# Patient Record
Sex: FEMALE | Race: WHITE | Employment: FULL TIME | URBAN - METROPOLITAN AREA
[De-identification: names, ages, dates, MRNs, and addresses within clinical notes are randomized per-mention and may not be internally consistent; named-entity substitution may affect disease eponyms.]

---

## 2017-01-27 ENCOUNTER — HOSPITAL ENCOUNTER (OUTPATIENT)
Dept: MAMMOGRAPHY | Age: 52
Discharge: HOME | End: 2017-01-27
Attending: FAMILY MEDICINE
Payer: COMMERCIAL

## 2017-01-27 DIAGNOSIS — Z09 FOLLOW-UP EXAM, 3-6 MONTHS SINCE PREVIOUS EXAM: ICD-10-CM

## 2017-01-27 PROCEDURE — 77062 BREAST TOMOSYNTHESIS BI: CPT

## 2017-01-27 PROCEDURE — 77066 DX MAMMO INCL CAD BI: CPT

## 2017-01-27 NOTE — PROGRESS NOTES
Quick Note:    Please call pt with normal results of mammogram and repeat annually as directed--- Dr. Marc Wright  ______

## 2017-03-04 ENCOUNTER — LAB ENCOUNTER (OUTPATIENT)
Dept: LAB | Age: 52
End: 2017-03-04
Attending: OTOLARYNGOLOGY
Payer: COMMERCIAL

## 2017-03-04 DIAGNOSIS — R05.9 COUGH: ICD-10-CM

## 2017-03-04 DIAGNOSIS — J32.0 CHRONIC MAXILLARY SINUSITIS: ICD-10-CM

## 2017-03-04 PROCEDURE — 86003 ALLG SPEC IGE CRUDE XTRC EA: CPT

## 2017-03-04 PROCEDURE — 36415 COLL VENOUS BLD VENIPUNCTURE: CPT

## 2017-03-08 LAB
ALLERGEN,  SHRIMP IGE: <0.1 KU/L
ALLERGEN, ALMOND IGE: <0.1 KU/L
ALLERGEN, BRAZIL NUT IGE: <0.1 KU/L
ALLERGEN, CASHEW IGE: <0.1 KU/L
ALLERGEN, CHESTNUT IGE: <0.1 KU/L
ALLERGEN, CLAM IGE: <0.1 KU/L
ALLERGEN, CODFISH: <0.1 KU/L
ALLERGEN, CORN IGE: <0.1 KU/L
ALLERGEN, EGG WHITE IGE: <0.1 KU/L
ALLERGEN, HAZELNUT IGE: <0.1 KU/L
ALLERGEN, MILK (COW) IGE: <0.1 KU/L
ALLERGEN, PEANUT IGE: <0.1 KU/L
ALLERGEN, PEANUT IGE: <0.1 KU/L
ALLERGEN, PECAN IGE: <0.1 KU/L
ALLERGEN, RICE IGE: <0.1 KU/L
ALLERGEN, SCALLOP IGE: <0.1 KU/L
ALLERGEN, SOYBEAN IGE: <0.1 KU/L
ALLERGEN, TOMATO IGE: <0.1 KU/L
ALLERGEN, WALNUT/BLACK WALNUT: <0.1 KU/L
ALLERGEN, WALNUT/BLACK WALNUT: <0.1 KU/L
ALLERGEN, WHEAT IGE: <0.1 KU/L
IMMUNOGLOBULIN E: 3 KU/L
Lab: <0.1 KU/L

## 2017-03-09 ENCOUNTER — MED REC SCAN ONLY (OUTPATIENT)
Dept: FAMILY MEDICINE CLINIC | Facility: CLINIC | Age: 52
End: 2017-03-09

## 2017-03-13 NOTE — PROGRESS NOTES
Received fax from [address] [place] [address] from [address] stating pt had colonoscopy completed 3/9/2017. Due every 10 yrs. Paper sent to scan.

## 2017-03-15 NOTE — PROGRESS NOTES
Quick Note:    Burton Cadena. I reviewed your blood allergy testing and all results are in the normal range with none suggesting a food allergy. In addition you total IgE which a marker of allergy inflammation in the body was in the normal range.  If you continue

## 2017-05-22 ENCOUNTER — TELEPHONE (OUTPATIENT)
Dept: FAMILY MEDICINE CLINIC | Facility: CLINIC | Age: 52
End: 2017-05-22

## 2017-05-22 DIAGNOSIS — E03.9 HYPOTHYROIDISM, ACQUIRED: Primary | ICD-10-CM

## 2017-05-22 RX ORDER — LEVOTHYROXINE SODIUM 112 UG/1
112 TABLET ORAL
Qty: 90 TABLET | Refills: 0 | Status: SHIPPED | OUTPATIENT
Start: 2017-05-22 | End: 2017-07-15

## 2017-05-22 NOTE — TELEPHONE ENCOUNTER
Refill protocol passed because the patient met the following protocol for Levothyroxine.   No further refills, patient needs labs

## 2017-07-12 NOTE — ADDENDUM NOTE
Encounter addended by: Laura Moran on: 7/12/2017  7:35 AM<BR>    Actions taken: Letter status changed

## 2017-07-15 DIAGNOSIS — Z13.220 SCREENING FOR LIPID DISORDERS: ICD-10-CM

## 2017-07-15 DIAGNOSIS — E03.9 HYPOTHYROIDISM, ACQUIRED: Primary | ICD-10-CM

## 2017-07-17 RX ORDER — LEVOTHYROXINE SODIUM 112 UG/1
TABLET ORAL
Qty: 30 TABLET | Refills: 0 | Status: SHIPPED | OUTPATIENT
Start: 2017-07-17 | End: 2017-09-20

## 2017-07-17 NOTE — TELEPHONE ENCOUNTER
Please call patient and let her know that she is due for repeat thyroid labs that were due in May. Fasting preventative labs have been placed as well for her to complete. Cbc,cmp, lipid,tsh are placed into the [facility] system.   Medication requested for levot

## 2017-07-17 NOTE — TELEPHONE ENCOUNTER
Spoke to pt, she is aware that there are fasting labs and will have them done.  Pt is also aware that there was 30 days of Levothyroxine were called in

## 2017-08-19 ENCOUNTER — LAB ENCOUNTER (OUTPATIENT)
Dept: LAB | Age: 52
End: 2017-08-19
Attending: FAMILY MEDICINE
Payer: COMMERCIAL

## 2017-08-19 DIAGNOSIS — E03.9 HYPOTHYROIDISM, ACQUIRED: ICD-10-CM

## 2017-08-19 DIAGNOSIS — Z13.220 SCREENING FOR LIPID DISORDERS: ICD-10-CM

## 2017-08-19 LAB
ALBUMIN SERPL-MCNC: 3.9 G/DL (ref 3.5–4.8)
ALP LIVER SERPL-CCNC: 56 U/L (ref 41–108)
ALT SERPL-CCNC: 19 U/L (ref 14–54)
AST SERPL-CCNC: 18 U/L (ref 15–41)
BASOPHILS # BLD AUTO: 0.05 X10(3) UL (ref 0–0.1)
BASOPHILS NFR BLD AUTO: 0.6 %
BILIRUB SERPL-MCNC: 0.6 MG/DL (ref 0.1–2)
BUN BLD-MCNC: 12 MG/DL (ref 8–20)
CALCIUM BLD-MCNC: 8.9 MG/DL (ref 8.3–10.3)
CHLORIDE: 104 MMOL/L (ref 101–111)
CHOLEST SMN-MCNC: 226 MG/DL (ref ?–200)
CO2: 27 MMOL/L (ref 22–32)
CREAT BLD-MCNC: 0.94 MG/DL (ref 0.55–1.02)
EOSINOPHIL # BLD AUTO: 0.14 X10(3) UL (ref 0–0.3)
EOSINOPHIL NFR BLD AUTO: 1.8 %
ERYTHROCYTE [DISTWIDTH] IN BLOOD BY AUTOMATED COUNT: 13.8 % (ref 11.5–16)
FREE T4: 1.1 NG/DL (ref 0.9–1.8)
GLUCOSE BLD-MCNC: 87 MG/DL (ref 70–99)
HCT VFR BLD AUTO: 41.2 % (ref 34–50)
HDLC SERPL-MCNC: 55 MG/DL (ref 45–?)
HDLC SERPL: 4.11 {RATIO} (ref ?–4.44)
HGB BLD-MCNC: 12.9 G/DL (ref 12–16)
IMMATURE GRANULOCYTE COUNT: 0.03 X10(3) UL (ref 0–1)
IMMATURE GRANULOCYTE RATIO %: 0.4 %
LDLC SERPL CALC-MCNC: 144 MG/DL (ref ?–130)
LDLC SERPL-MCNC: 27 MG/DL (ref 5–40)
LYMPHOCYTES # BLD AUTO: 2.32 X10(3) UL (ref 0.9–4)
LYMPHOCYTES NFR BLD AUTO: 29.3 %
M PROTEIN MFR SERPL ELPH: 7.6 G/DL (ref 6.1–8.3)
MCH RBC QN AUTO: 28.1 PG (ref 27–33.2)
MCHC RBC AUTO-ENTMCNC: 31.3 G/DL (ref 31–37)
MCV RBC AUTO: 89.8 FL (ref 81–100)
MONOCYTES # BLD AUTO: 0.61 X10(3) UL (ref 0.1–0.6)
MONOCYTES NFR BLD AUTO: 7.7 %
NEUTROPHIL ABS PRELIM: 4.77 X10 (3) UL (ref 1.3–6.7)
NEUTROPHILS # BLD AUTO: 4.77 X10(3) UL (ref 1.3–6.7)
NEUTROPHILS NFR BLD AUTO: 60.2 %
NONHDLC SERPL-MCNC: 171 MG/DL (ref ?–130)
PLATELET # BLD AUTO: 259 10(3)UL (ref 150–450)
POTASSIUM SERPL-SCNC: 4.4 MMOL/L (ref 3.6–5.1)
RBC # BLD AUTO: 4.59 X10(6)UL (ref 3.8–5.1)
RED CELL DISTRIBUTION WIDTH-SD: 45 FL (ref 35.1–46.3)
SODIUM SERPL-SCNC: 137 MMOL/L (ref 136–144)
TRIGLYCERIDES: 135 MG/DL (ref ?–150)
TSI SER-ACNC: 2.01 MIU/ML (ref 0.35–5.5)
WBC # BLD AUTO: 7.9 X10(3) UL (ref 4–13)

## 2017-08-19 PROCEDURE — 36415 COLL VENOUS BLD VENIPUNCTURE: CPT | Performed by: FAMILY MEDICINE

## 2017-08-19 PROCEDURE — 80050 GENERAL HEALTH PANEL: CPT | Performed by: FAMILY MEDICINE

## 2017-08-19 PROCEDURE — 80061 LIPID PANEL: CPT | Performed by: FAMILY MEDICINE

## 2017-08-19 PROCEDURE — 84439 ASSAY OF FREE THYROXINE: CPT | Performed by: FAMILY MEDICINE

## 2017-09-02 NOTE — PROGRESS NOTES
Please call pt with stable CBC, thyroid, CMP, lipid results and repeat as directed annually and apologize to pt for late results as I was out of office at time these were drawn ---  Dr. Lord Spearing

## 2017-09-20 ENCOUNTER — OFFICE VISIT (OUTPATIENT)
Dept: FAMILY MEDICINE CLINIC | Facility: CLINIC | Age: 52
End: 2017-09-20

## 2017-09-20 ENCOUNTER — HOSPITAL ENCOUNTER (OUTPATIENT)
Dept: GENERAL RADIOLOGY | Age: 52
Discharge: HOME | End: 2017-09-20
Attending: FAMILY MEDICINE
Payer: COMMERCIAL

## 2017-09-20 VITALS
HEIGHT: 68 IN | SYSTOLIC BLOOD PRESSURE: 120 MMHG | BODY MASS INDEX: 32.28 KG/M2 | HEART RATE: 83 BPM | DIASTOLIC BLOOD PRESSURE: 68 MMHG | RESPIRATION RATE: 12 BRPM | OXYGEN SATURATION: 99 % | WEIGHT: 213 LBS | TEMPERATURE: 98 F

## 2017-09-20 DIAGNOSIS — Z13.31 NEGATIVE DEPRESSION SCREENING: ICD-10-CM

## 2017-09-20 DIAGNOSIS — Z23 FLU VACCINE NEED: ICD-10-CM

## 2017-09-20 DIAGNOSIS — M25.551 PAIN OF RIGHT HIP JOINT: ICD-10-CM

## 2017-09-20 DIAGNOSIS — E03.9 HYPOTHYROIDISM, ACQUIRED: ICD-10-CM

## 2017-09-20 DIAGNOSIS — Z00.00 ANNUAL PHYSICAL EXAM: Primary | ICD-10-CM

## 2017-09-20 DIAGNOSIS — Z23 NEED FOR VACCINATION: ICD-10-CM

## 2017-09-20 PROCEDURE — 90686 IIV4 VACC NO PRSV 0.5 ML IM: CPT | Performed by: FAMILY MEDICINE

## 2017-09-20 PROCEDURE — 73502 X-RAY EXAM HIP UNI 2-3 VIEWS: CPT | Performed by: FAMILY MEDICINE

## 2017-09-20 PROCEDURE — 99396 PREV VISIT EST AGE 40-64: CPT | Performed by: FAMILY MEDICINE

## 2017-09-20 PROCEDURE — 90471 IMMUNIZATION ADMIN: CPT | Performed by: FAMILY MEDICINE

## 2017-09-20 RX ORDER — LEVOCETIRIZINE DIHYDROCHLORIDE 5 MG/1
5 TABLET, FILM COATED ORAL EVERY EVENING
COMMUNITY
End: 2018-01-15 | Stop reason: ALTCHOICE

## 2017-09-20 RX ORDER — LEVOTHYROXINE SODIUM 112 UG/1
112 TABLET ORAL
Qty: 90 TABLET | Refills: 3 | Status: SHIPPED | OUTPATIENT
Start: 2017-09-20 | End: 2018-09-14

## 2017-09-20 NOTE — PATIENT INSTRUCTIONS
Schedule XR Hip  Tylenol for pain  Continue to focus on weight loss    Thank you for allowing me to participate in your care today. I will contact you with any results from today's visit.   Lab results are typically available in 2-3 days for blood tests,

## 2017-09-20 NOTE — PROGRESS NOTES
HPI:   Angela Aly is a 46year old female that presents for annual exam.     She has a hx of chronic cough, currently being seen by ENT cough specialist and on gabapentin to \"help retrain her cough reflex\". She has noticed a dramatic improvement. rhythm, no murmurs, rubs or gallops. LUNGS: Clear to auscultation bilterally, no rales/rhonchi/wheezing. ABDOMEN:  Soft, nondistended, nontender, bowel sounds normal in all 4 quadrants, no masses, no hepatosplenomegaly.   EXTREMITIES:  No edema, no cyanos

## 2017-10-02 ENCOUNTER — OFFICE VISIT (OUTPATIENT)
Dept: PHYSICAL THERAPY | Age: 52
End: 2017-10-02
Attending: FAMILY MEDICINE
Payer: COMMERCIAL

## 2017-10-02 DIAGNOSIS — M25.551 PAIN OF RIGHT HIP JOINT: ICD-10-CM

## 2017-10-02 PROCEDURE — 97162 PT EVAL MOD COMPLEX 30 MIN: CPT

## 2017-10-02 PROCEDURE — 97110 THERAPEUTIC EXERCISES: CPT

## 2017-10-02 NOTE — PROGRESS NOTES
LOWER EXTREMITY EVALUATION:   Referring Physician: Dr. Jerod Ram  Diagnosis: R Hip pain     Date of Service: 10/2/2017     PATIENT SUMMARY   Tex Hashimoto is a 46year old y/o female who presents to therapy today with complaints of right hip pain.  Progressiv passive elongation. Fredo Leslie would benefit from skilled Physical Therapy to address the above impairments to improve hip ROM, improve strength, improve lumbar mobility, muscle length in order to improve functional mobility and quality of life.      Precaution R 5/5; L 5/5  Extension: R 5/5; L 5/5    DF: R 5/5; L 5/5  PF: R 5/5; L 5/5  INV: R 5/5; L 5/5  EV: R 5/5; L 5/5  Great toe ext: R 5-/5; L 5/5     Special tests:   Hip/Lumbar Distraction: R +, L -  SI compression/distraction: -  Anterior thigh thrust: R + Re-education;  Therapeutic Activity; Gait Training; Electrical Stim; Pt education; Home exercise program instructions;     Education or treatment limitation: None  Rehab Potential:excellent    FOTO: 49/100    Patient/Family/Caregiver was advised of these fi

## 2017-10-09 ENCOUNTER — OFFICE VISIT (OUTPATIENT)
Dept: PHYSICAL THERAPY | Age: 52
End: 2017-10-09
Attending: FAMILY MEDICINE
Payer: COMMERCIAL

## 2017-10-09 PROCEDURE — 97110 THERAPEUTIC EXERCISES: CPT

## 2017-10-09 PROCEDURE — 97140 MANUAL THERAPY 1/> REGIONS: CPT

## 2017-10-09 NOTE — PROGRESS NOTES
Dx: R hip pain - SIJ and lumbar         Authorized # of Visits:  8         Next MD visit: none scheduled  Fall Risk: standard         Precautions: n/a             Subjective: States the left hip is increasing in pain, right hip has remained the same with m TX#: 4/ Date:               TX#: 5/ Date:               TX#: 6/ Date:               TX#: 7/ Date:               TX#: 8/   Nustep x 8 min         Hip/lumbar screen - 5 min          Manual:   Long axis distraction - hold 30 sec x 8 reps  Off 15 sec

## 2017-10-12 ENCOUNTER — OFFICE VISIT (OUTPATIENT)
Dept: PHYSICAL THERAPY | Age: 52
End: 2017-10-12
Attending: FAMILY MEDICINE
Payer: COMMERCIAL

## 2017-10-12 PROCEDURE — 97140 MANUAL THERAPY 1/> REGIONS: CPT

## 2017-10-12 PROCEDURE — 97110 THERAPEUTIC EXERCISES: CPT

## 2017-10-12 NOTE — PROGRESS NOTES
Dx: R hip pain - SIJ and lumbar         Authorized # of Visits:  8         Next MD visit: none scheduled  Fall Risk: standard         Precautions: n/a             Subjective: Right hip 3/10 Left 2/10 states she has felt more aching overall yesterday.  Relat including stair negotiation. (8 visits)  -Pt to be independent and compliant with HEP.        Plan: open chain exercises hip strengthening, bridging, and add resistance   Date: 10/9/2017 TX#: 2/8 Date:     10/12/2017           TX#: 3/   Date:

## 2017-10-16 ENCOUNTER — OFFICE VISIT (OUTPATIENT)
Dept: PHYSICAL THERAPY | Age: 52
End: 2017-10-16
Attending: FAMILY MEDICINE
Payer: COMMERCIAL

## 2017-10-16 PROCEDURE — 97140 MANUAL THERAPY 1/> REGIONS: CPT

## 2017-10-16 PROCEDURE — 97110 THERAPEUTIC EXERCISES: CPT

## 2017-10-16 NOTE — PROGRESS NOTES
Dx: R hip pain - SIJ and lumbar         Authorized # of Visits:  8         Next MD visit: none scheduled  Fall Risk: standard         Precautions: n/a             Subjective: right hip 3/10, left hip 2/10.  She states she feels better after movement, exerci independent and compliant with HEP.        Plan: open chain exercises hip strengthening, bridging, and add resistance   Date: 10/9/2017 TX#: 2/8 Date:     10/12/2017           TX#: 3/   Date:    10/16/2017            TX#: 4/ Date:               TX#: 5/ Date education, demonstration, verbal and tactile cues to optimize performance and motor control    Charges: MT x 1 TE x 2       (HP x 10 min ) Total Timed Treatment: 45 min  Total Treatment Time: 55 min

## 2017-10-18 ENCOUNTER — OFFICE VISIT (OUTPATIENT)
Dept: PHYSICAL THERAPY | Age: 52
End: 2017-10-18
Attending: FAMILY MEDICINE
Payer: COMMERCIAL

## 2017-10-18 PROCEDURE — 97110 THERAPEUTIC EXERCISES: CPT

## 2017-10-18 PROCEDURE — 97140 MANUAL THERAPY 1/> REGIONS: CPT

## 2017-10-18 NOTE — PROGRESS NOTES
Dx: R hip pain - SIJ and lumbar          Authorized # of Visits:  8         Next MD visit: none scheduled  Fall Risk: standard         Precautions: n/a             Subjective: right hip 3/10, left hip 2/10.  States yesterday she wore different shoes and fel for stair negotiation. (8 visits)  -Pt to demonstrate pain free SLS on R for 20 seconds, to improve stability for single leg tasks including stair negotiation. (8 visits)  -Pt to be independent and compliant with HEP.        Plan: open chain exercises hip s 10x2  Bent knee swiss ball bridge  Supine bent knee fall out 10x2 RTB  Supine bridge 10x2   Supine bridge with RTB for abduction 10x2   Swiss ball bridge 10x2  Bent knee swiss ball bridge       Leg swings with ankle weight (1#)  Hip abduction standing 10x2

## 2017-10-23 ENCOUNTER — OFFICE VISIT (OUTPATIENT)
Dept: PHYSICAL THERAPY | Age: 52
End: 2017-10-23
Attending: FAMILY MEDICINE
Payer: COMMERCIAL

## 2017-10-23 PROCEDURE — 97140 MANUAL THERAPY 1/> REGIONS: CPT

## 2017-10-23 PROCEDURE — 97110 THERAPEUTIC EXERCISES: CPT

## 2017-10-23 NOTE — PROGRESS NOTES
Dx: R hip pain - SIJ and lumbar          Authorized # of Visits:  8         Next MD visit: none scheduled  Fall Risk: standard         Precautions: n/a             Subjective: right hip 2.5/10, left hip 0/10 today.  States she is feeling a little better, wa bridging, and add resistance   Date: 10/9/2017 TX#: 2/8 Date:     10/12/2017           TX#: 3/   Date:    10/16/2017            TX#: 4/ Date: 10/18/2017               TX#: 5/ Date:      10/23/2017          TX#: 6/ Date:               TX#: 7/ Date: ball bridge  Supine bent knee fall out 10x2 RTB  Supine bridge 10x2   Supine bridge with RTB for abduction 10x2   Swiss ball bridge 10x2  Bent knee swiss ball bridge  Bridge 10x2   SL bridge  10x2  Bridge on  Swiss ball 10x2       Leg swings with ankle maria elena

## 2017-11-01 ENCOUNTER — OFFICE VISIT (OUTPATIENT)
Dept: PHYSICAL THERAPY | Age: 52
End: 2017-11-01
Attending: FAMILY MEDICINE
Payer: COMMERCIAL

## 2017-11-01 PROCEDURE — 97110 THERAPEUTIC EXERCISES: CPT

## 2017-11-01 PROCEDURE — 97140 MANUAL THERAPY 1/> REGIONS: CPT

## 2017-11-01 NOTE — PROGRESS NOTES
Dx: R hip pain - SIJ and lumbar          Authorized # of Visits:  8         Next MD visit: none scheduled  Fall Risk: standard         Precautions: n/a             Subjective: right hip 2.0/10; left hip 0/10. While on trip pain more increased in evening.  P Date:      10/23/2017          TX#: 6/ Date:       11/1/2017         TX#: 7/ Date:               TX#: 8/   Nustep x 8 min Nustep x 8 min Nustep x 8 min Nustep x 8 min TM 2.0  Mph 5 m in  TM 2.0  Mph 5 m in     Hip/lumbar screen - 5 min  Hip/lumbar screen - 10x2  Bent knee swiss ball bridge  Supine bent knee fall out 10x2 RTB  Supine bridge 10x2   Supine bridge with RTB for abduction 10x2   Swiss ball bridge 10x2  Bent knee swiss ball bridge  Bridge 10x2   SL bridge  10x2  Bridge on  Swiss ball 10x2   SL brid

## 2017-11-16 ENCOUNTER — OFFICE VISIT (OUTPATIENT)
Dept: PHYSICAL THERAPY | Age: 52
End: 2017-11-16
Attending: FAMILY MEDICINE
Payer: COMMERCIAL

## 2017-11-16 PROCEDURE — 97110 THERAPEUTIC EXERCISES: CPT

## 2017-11-16 PROCEDURE — 97140 MANUAL THERAPY 1/> REGIONS: CPT

## 2017-11-16 NOTE — PROGRESS NOTES
Dx: R hip pain - SIJ and lumbar          Authorized # of Visits:  8         Next MD visit: none scheduled  Fall Risk: standard         Precautions: n/a             Subjective: right hip 30/10; left hip 0/10.   Having difficulty sleeping on both sides     Ob Date:    10/16/2017            TX#: 4/ Date: 10/18/2017               TX#: 5/ Date:      10/23/2017          TX#: 6/ Date:       11/1/2017         TX#: 7/ Date: 11/16/2017                TX#: 8/   Nustep x 8 min Nustep x 8 min Nustep x 8 min Nustep x 8 min 10x3  Supine bent knee fall out 10x2 YTB Supine bent knee fall out 10x2 YTB  Supine bridge 10x2   Supine bridge with YTB for abduction 10x2  Supine bent knee fall out 10x2 YTB  Supine bridge 10x2   Supine bridge with RTB for abduction 10x2   Swiss ball [place]

## 2017-11-20 ENCOUNTER — OFFICE VISIT (OUTPATIENT)
Dept: PHYSICAL THERAPY | Age: 52
End: 2017-11-20
Attending: FAMILY MEDICINE
Payer: COMMERCIAL

## 2017-11-20 PROCEDURE — 97140 MANUAL THERAPY 1/> REGIONS: CPT

## 2017-11-20 PROCEDURE — 97110 THERAPEUTIC EXERCISES: CPT

## 2017-11-20 NOTE — PROGRESS NOTES
Dx: R hip pain - SIJ and lumbar          Authorized # of Visits:  8   Extended 11/20/2017       Next MD visit: none scheduled  Fall Risk: standard         Precautions: n/a             Subjective: right hip 2/10; left hip 0/10.   Having benefit from foam rol improve stability for single leg tasks including stair negotiation. (8 visits)  -Pt to be independent and compliant with HEP.        Plan: open chain exercises hip strengthening, bridging, and add resistance   Date: 10/9/2017 TX#: 2/8 Date:     10/12/2017 comparable sign)  STM to ITband  (both  Sides)    Foam roll to glute med and min and figure 4 stretch     25 min    Piriformis stretch - 30 sec x 3 sets each side  IT  Band stretch  30 sec x 2  Piriformis stretch   In figure four position 30 sec x 3 sets e severity of symptoms; specific exercises selected based on patients  Impairments; specific education, demonstration, verbal and tactile cues to optimize performance and motor control    Charges: MT x 1 TE x 2   Total Timed Treatment: 45 min  Total Treatmen

## 2017-11-27 ENCOUNTER — OFFICE VISIT (OUTPATIENT)
Dept: PHYSICAL THERAPY | Age: 52
End: 2017-11-27
Attending: FAMILY MEDICINE
Payer: COMMERCIAL

## 2017-11-27 PROCEDURE — 97110 THERAPEUTIC EXERCISES: CPT

## 2017-11-27 PROCEDURE — 97140 MANUAL THERAPY 1/> REGIONS: CPT

## 2017-11-27 NOTE — PROGRESS NOTES
Dx: R hip pain - SIJ and lumbar          Authorized # of Visits:  8   Extended 11/20/2017       Next MD visit: none scheduled  Fall Risk: standard         Precautions: n/a             Subjective: right hip 1/10; left hip 0/10.  She states she is feeling a l Date: 10/9/2017 TX#: 2/8 Date:     10/12/2017           TX#: 3/   Date:    10/16/2017            TX#: 4/ Date: 10/18/2017               TX#: 5/ Date:      10/23/2017          TX#: 6/ Date:       11/1/2017         TX#: 7/ Date: 11/16/2017                T extension comparable sign)  STM to ITband  (both  Sides)    Foam roll to glute med and min and figure 4 stretch     25 min   Piriformis stretch - 30 sec x 3 sets each side  IT  Band stretch  30 sec x 2  Piriformis stretch   In figure four position 30 sec x Reformer 10x each leg 2 sets (4 cords)  Double leg 7 cords 12x2    Heat pack 10 min  Heat pack 10 min Heat pack 10 min Heat pack 10 min        Skilled Services: manual intervention to address impairments with consideration of irritability and severity of s

## 2017-12-05 ENCOUNTER — APPOINTMENT (OUTPATIENT)
Dept: PHYSICAL THERAPY | Age: 52
End: 2017-12-05
Attending: FAMILY MEDICINE
Payer: COMMERCIAL

## 2017-12-06 ENCOUNTER — OFFICE VISIT (OUTPATIENT)
Dept: PHYSICAL THERAPY | Age: 52
End: 2017-12-06
Attending: FAMILY MEDICINE
Payer: COMMERCIAL

## 2017-12-06 DIAGNOSIS — E03.9 HYPOTHYROIDISM, ACQUIRED: ICD-10-CM

## 2017-12-06 PROCEDURE — 97140 MANUAL THERAPY 1/> REGIONS: CPT

## 2017-12-06 PROCEDURE — 97110 THERAPEUTIC EXERCISES: CPT

## 2017-12-06 NOTE — TELEPHONE ENCOUNTER
From: Leona Carrel  Sent: 12/6/2017 1:55 PM CST  Subject: Medication Renewal Request    Leona Carrel would like a refill of the following medications:     Levothyroxine Sodium 112 MCG Oral Tab Garcia Funez, DO]    [facility]erred [facility]

## 2017-12-06 NOTE — PROGRESS NOTES
Dx: R hip pain - SIJ and lumbar          Authorized # of Visits:  8   Extended 11/20/2017       Next MD visit: none scheduled  Fall Risk: standard         Precautions: n/a             Subjective: right hip 1/10; left hip 0/10.  States that she was traveling 4/ Date: 10/18/2017               TX#: 5/ Date:      10/23/2017          TX#: 6/ Date:       11/1/2017         TX#: 7/ Date: 11/16/2017                TX#: 8/ 11/20/2017    TX  9 11/27/2017   TX 10  12/6/2017   TX 11     Nustep x 8 min Nustep x 8 min TM 2. YTB  Supine bridge 10x2   Supine bridge with RTB for abduction 10x2   Swiss ball bridge 10x2  Bent knee swiss ball bridge  Supine bent knee fall out 10x2 RTB  Supine bridge 10x2   Supine bridge with RTB for abduction 10x2   Swiss ball bridge 10x2  Bent kne

## 2017-12-07 RX ORDER — LEVOTHYROXINE SODIUM 112 UG/1
112 TABLET ORAL
Qty: 90 TABLET | Refills: 3
Start: 2017-12-07 | End: 2018-12-02

## 2017-12-07 NOTE — TELEPHONE ENCOUNTER
Requesting: Levothyroxine 112mcg  LOV: 9/20/17  RTC: 1 year  Last Relevant Labs: 8/19/17  Filled: 9/20/17 #90 with 3 refills    Future Appointments  Date Time Provider Dali Moore   12/13/2017 2:50 PM John Prajapati MD [id number] [address]   12/20

## 2017-12-20 ENCOUNTER — OFFICE VISIT (OUTPATIENT)
Dept: PHYSICAL THERAPY | Age: 52
End: 2017-12-20
Attending: FAMILY MEDICINE
Payer: COMMERCIAL

## 2017-12-20 PROCEDURE — 97110 THERAPEUTIC EXERCISES: CPT

## 2017-12-20 NOTE — PROGRESS NOTES
Discharge Summary    Pt has attended 12, cancelled 2, and no shown 0 visits in Physical Therapy. Subjective: Tacho Chen states she feels like she has made 80% progress.  She does think she is ready to be discharged from therapy due to her pain has been  Mini eval  Was positive)  FAIDERS: r -  l - (at eval  Was positive)    Goals:   -Pt to demonstrate ability to walk for 5 minutes before onset of pain for patient to walk into work office without discomfort. (4 visits) 11/20/2017   -Pt to demonstrate ability to axis distraction - hold 30 sec x 8 reps  Off 15 sec     (standing hip extension comparable sign)  STM to ITband  (both  Sides)    15  Min  Manual:   (standing hip extension comparable sign)  STM to ITband  (both  Sides)    Foam roll to glute med and min cords)  Double leg 8 cords 15x2            Skilled Services: manual intervention to address impairments with consideration of irritability and severity of symptoms; specific exercises selected based on patients  Impairments; specific education, demonstrati

## 2018-01-15 ENCOUNTER — OFFICE VISIT (OUTPATIENT)
Dept: FAMILY MEDICINE CLINIC | Facility: CLINIC | Age: 53
End: 2018-01-15

## 2018-01-15 VITALS
OXYGEN SATURATION: 99 % | RESPIRATION RATE: 18 BRPM | HEART RATE: 78 BPM | TEMPERATURE: 99 F | WEIGHT: 213 LBS | SYSTOLIC BLOOD PRESSURE: 122 MMHG | BODY MASS INDEX: 32 KG/M2 | DIASTOLIC BLOOD PRESSURE: 60 MMHG

## 2018-01-15 DIAGNOSIS — R35.0 URINE FREQUENCY: Primary | ICD-10-CM

## 2018-01-15 LAB
APPEARANCE: CLEAR
MULTISTIX LOT#: ABNORMAL NUMERIC
PH, URINE: 6.5 (ref 4.5–8)
SPECIFIC GRAVITY: 1.02 (ref 1–1.03)
URINE-COLOR: YELLOW
UROBILINOGEN,SEMI-QN: 0.2 MG/DL (ref 0–1.9)

## 2018-01-15 PROCEDURE — 87077 CULTURE AEROBIC IDENTIFY: CPT | Performed by: NURSE PRACTITIONER

## 2018-01-15 PROCEDURE — 99213 OFFICE O/P EST LOW 20 MIN: CPT | Performed by: NURSE PRACTITIONER

## 2018-01-15 PROCEDURE — 87186 SC STD MICRODIL/AGAR DIL: CPT | Performed by: NURSE PRACTITIONER

## 2018-01-15 PROCEDURE — 81003 URINALYSIS AUTO W/O SCOPE: CPT | Performed by: NURSE PRACTITIONER

## 2018-01-15 PROCEDURE — 87086 URINE CULTURE/COLONY COUNT: CPT | Performed by: NURSE PRACTITIONER

## 2018-01-15 RX ORDER — PHENAZOPYRIDINE HYDROCHLORIDE 200 MG/1
200 TABLET, FILM COATED ORAL 3 TIMES DAILY PRN
Qty: 6 TABLET | Refills: 0 | Status: SHIPPED | OUTPATIENT
Start: 2018-01-15 | End: 2018-01-17

## 2018-01-15 RX ORDER — NITROFURANTOIN 25; 75 MG/1; MG/1
100 CAPSULE ORAL 2 TIMES DAILY
Qty: 14 CAPSULE | Refills: 0 | Status: SHIPPED | OUTPATIENT
Start: 2018-01-15 | End: 2018-01-22

## 2018-01-15 NOTE — PROGRESS NOTES
CHIEF COMPLAINT:   Patient presents with:  UTI: pt c\o of poss uti, x3days       HPI:   Waleska Costa is a 46year old female who presents with symptoms of UTI. Complaining of urinary frequency, urgency, and dysuria for last 3 days.  Symptoms have been st EYES:denies blurred vision or double vision  HEENT: no congestion, rhinorrhea, sore throat or ear pain  CARDIOVASCULAR: denies chest pain or palpitations  LUNGS: denies shortness of breath, cough, or wheezing  GI: See HPI. No N/V/C/D. : See HPI.   NEURO Phenazopyridine HCl 200 MG Oral Tab 6 tablet 0      Sig: Take 1 tablet (200 mg total) by mouth 3 (three) times daily as needed for Pain. Risk and benefits of medication discussed. Stressed importance of completing full course of antibiotic. The most common cause of bladder infections is bacteria from the bowels. The bacteria get onto the skin around the opening of the urethra. From there, they can get into the urine and travel up to the bladder, causing inflammation and infection.  This usuall · Urinate more often. Don't try to hold urine in for a long time. · Wear loose-fitting clothes and cotton underwear. Avoid tight-fitting pants. · Improve your diet and prevent constipation.  Eat more fresh fruit and vegetables, and fiber, and less junk an The patient is asked to return in 3 days if not better. Call if fever, vomiting, worsening symptoms.

## 2018-01-23 ENCOUNTER — PATIENT MESSAGE (OUTPATIENT)
Dept: FAMILY MEDICINE CLINIC | Facility: CLINIC | Age: 53
End: 2018-01-23

## 2018-01-23 DIAGNOSIS — Z12.31 ENCOUNTER FOR SCREENING MAMMOGRAM FOR MALIGNANT NEOPLASM OF BREAST: Primary | ICD-10-CM

## 2018-01-23 NOTE — TELEPHONE ENCOUNTER
From: Pierre Garcia  To: Marian Winkler DO  Sent: 1/23/2018 3:37 PM CST  Subject: Non-Urgent Medical Question    Please put a mammogram order in for me. I am due in January. Thanks.

## 2018-01-23 NOTE — TELEPHONE ENCOUNTER
Requesting Mammogram  LOV: 9/20/17  RTC: one year  Last Relevant Labs: 1/27/17    Notes Recorded by Angela Shaw DO on 1/27/2017 at 8:53 AM  Please call pt with normal results of mammogram and repeat annually as directed---  Dr. Keila Duke

## 2018-02-17 ENCOUNTER — HOSPITAL ENCOUNTER (OUTPATIENT)
Dept: MAMMOGRAPHY | Age: 53
Discharge: HOME | End: 2018-02-17
Attending: FAMILY MEDICINE
Payer: COMMERCIAL

## 2018-02-17 DIAGNOSIS — Z12.31 ENCOUNTER FOR SCREENING MAMMOGRAM FOR MALIGNANT NEOPLASM OF BREAST: ICD-10-CM

## 2018-02-17 PROCEDURE — 77067 SCR MAMMO BI INCL CAD: CPT | Performed by: FAMILY MEDICINE

## 2018-02-26 ENCOUNTER — HOSPITAL ENCOUNTER (OUTPATIENT)
Dept: MAMMOGRAPHY | Age: 53
Discharge: HOME | End: 2018-02-26
Attending: FAMILY MEDICINE
Payer: COMMERCIAL

## 2018-02-26 DIAGNOSIS — R92.2 INCONCLUSIVE MAMMOGRAM: ICD-10-CM

## 2018-02-26 DIAGNOSIS — Z12.31 ENCOUNTER FOR SCREENING MAMMOGRAM FOR MALIGNANT NEOPLASM OF BREAST: Primary | ICD-10-CM

## 2018-02-26 PROCEDURE — 77061 BREAST TOMOSYNTHESIS UNI: CPT | Performed by: FAMILY MEDICINE

## 2018-02-26 PROCEDURE — 77065 DX MAMMO INCL CAD UNI: CPT | Performed by: FAMILY MEDICINE

## 2018-08-26 ENCOUNTER — HOSPITAL ENCOUNTER (OUTPATIENT)
Dept: ULTRASOUND IMAGING | Age: 53
Discharge: HOME | End: 2018-08-26
Attending: OTOLARYNGOLOGY
Payer: COMMERCIAL

## 2018-08-26 DIAGNOSIS — E04.1 THYROID NODULE: ICD-10-CM

## 2018-08-26 PROCEDURE — 76536 US EXAM OF HEAD AND NECK: CPT | Performed by: OTOLARYNGOLOGY

## 2018-08-27 ENCOUNTER — PATIENT MESSAGE (OUTPATIENT)
Dept: FAMILY MEDICINE CLINIC | Facility: CLINIC | Age: 53
End: 2018-08-27

## 2018-08-27 DIAGNOSIS — E07.9 THYROID DISORDER: Primary | ICD-10-CM

## 2018-08-27 DIAGNOSIS — Z00.00 LABORATORY EXAMINATION ORDERED AS PART OF A ROUTINE GENERAL MEDICAL EXAMINATION: ICD-10-CM

## 2018-08-27 DIAGNOSIS — E78.2 MIXED HYPERLIPIDEMIA: ICD-10-CM

## 2018-08-27 NOTE — PROGRESS NOTES
Ang Thomason. Your thyroid ultrasound shows 2 nodules. The left nodule is smaller than previously, but there is a new right nodule. I would like to see you back in the office to discuss this nodule and possible biopsy. Let me know if you have any questions.

## 2018-08-28 PROBLEM — E78.2 MIXED HYPERLIPIDEMIA: Status: ACTIVE | Noted: 2017-01-01

## 2018-08-28 NOTE — TELEPHONE ENCOUNTER
From: Pita Avalos  To: Brown Dinero MD  Sent: 8/27/2018 8:43 PM CDT  Subject: Other    I have a physical on 9/20. Are there labs I should have done before then? Please put in order if so.  Thx

## 2018-08-28 NOTE — PROGRESS NOTES
Patient informed via [facility]
Requesting labs prior to appt   LOV: 9/20/17  RTC: none   Last Relevant Labs: 8/2017      Future Appointments  Date Time Provider Dali Teresa   9/4/2018 7:50 AM Doc Jarquin MD  [facility] [place]   9/21/2018 7:00 AM Zina Russell MD [facility]
sensation is normal and strength is normal.

## 2018-09-03 PROBLEM — E04.2 MULTIPLE THYROID NODULES: Status: ACTIVE | Noted: 2018-09-03

## 2018-09-04 PROBLEM — E03.8 HYPOTHYROIDISM DUE TO HASHIMOTO'S THYROIDITIS: Status: ACTIVE | Noted: 2018-09-04

## 2018-09-04 PROBLEM — E06.3 HYPOTHYROIDISM DUE TO HASHIMOTO'S THYROIDITIS: Status: ACTIVE | Noted: 2018-09-04

## 2018-09-10 ENCOUNTER — HOSPITAL ENCOUNTER (OUTPATIENT)
Dept: ULTRASOUND IMAGING | Facility: HOSPITAL | Age: 53
Discharge: HOME | End: 2018-09-10
Attending: OTOLARYNGOLOGY
Payer: COMMERCIAL

## 2018-09-10 DIAGNOSIS — E04.2 MULTIPLE THYROID NODULES: ICD-10-CM

## 2018-09-10 PROCEDURE — 10022 US FNA THYROID (CPT=10022/76942): CPT | Performed by: OTOLARYNGOLOGY

## 2018-09-10 PROCEDURE — 88173 CYTOPATH EVAL FNA REPORT: CPT | Performed by: OTOLARYNGOLOGY

## 2018-09-10 PROCEDURE — 76942 ECHO GUIDE FOR BIOPSY: CPT | Performed by: OTOLARYNGOLOGY

## 2018-09-13 ENCOUNTER — LAB ENCOUNTER (OUTPATIENT)
Dept: LAB | Age: 53
End: 2018-09-13
Attending: FAMILY MEDICINE
Payer: COMMERCIAL

## 2018-09-13 DIAGNOSIS — E07.9 THYROID DISORDER: ICD-10-CM

## 2018-09-13 DIAGNOSIS — Z00.00 LABORATORY EXAMINATION ORDERED AS PART OF A ROUTINE GENERAL MEDICAL EXAMINATION: ICD-10-CM

## 2018-09-13 DIAGNOSIS — E78.2 MIXED HYPERLIPIDEMIA: ICD-10-CM

## 2018-09-13 LAB
ALBUMIN SERPL-MCNC: 3.8 G/DL (ref 3.5–4.8)
ALBUMIN/GLOB SERPL: 1 {RATIO} (ref 1–2)
ALP LIVER SERPL-CCNC: 60 U/L (ref 41–108)
ALT SERPL-CCNC: 19 U/L (ref 14–54)
ANION GAP SERPL CALC-SCNC: 6 MMOL/L (ref 0–18)
AST SERPL-CCNC: 14 U/L (ref 15–41)
BASOPHILS # BLD AUTO: 0.05 X10(3) UL (ref 0–0.1)
BASOPHILS NFR BLD AUTO: 0.8 %
BILIRUB SERPL-MCNC: 0.6 MG/DL (ref 0.1–2)
BUN BLD-MCNC: 12 MG/DL (ref 8–20)
BUN/CREAT SERPL: 12.5 (ref 10–20)
CALCIUM BLD-MCNC: 8.6 MG/DL (ref 8.3–10.3)
CHLORIDE SERPL-SCNC: 104 MMOL/L (ref 101–111)
CHOLEST SMN-MCNC: 195 MG/DL (ref ?–200)
CO2 SERPL-SCNC: 27 MMOL/L (ref 22–32)
CREAT BLD-MCNC: 0.96 MG/DL (ref 0.55–1.02)
EOSINOPHIL # BLD AUTO: 0.15 X10(3) UL (ref 0–0.3)
EOSINOPHIL NFR BLD AUTO: 2.4 %
ERYTHROCYTE [DISTWIDTH] IN BLOOD BY AUTOMATED COUNT: 13.1 % (ref 11.5–16)
GLOBULIN PLAS-MCNC: 4 G/DL (ref 2.5–4)
GLUCOSE BLD-MCNC: 86 MG/DL (ref 70–99)
HCT VFR BLD AUTO: 39.1 % (ref 34–50)
HDLC SERPL-MCNC: 49 MG/DL (ref 40–59)
HGB BLD-MCNC: 12.4 G/DL (ref 12–16)
IMMATURE GRANULOCYTE COUNT: 0.01 X10(3) UL (ref 0–1)
IMMATURE GRANULOCYTE RATIO %: 0.2 %
LDLC SERPL CALC-MCNC: 126 MG/DL (ref ?–100)
LYMPHOCYTES # BLD AUTO: 1.63 X10(3) UL (ref 0.9–4)
LYMPHOCYTES NFR BLD AUTO: 25.9 %
M PROTEIN MFR SERPL ELPH: 7.8 G/DL (ref 6.1–8.3)
MCH RBC QN AUTO: 28.5 PG (ref 27–33.2)
MCHC RBC AUTO-ENTMCNC: 31.7 G/DL (ref 31–37)
MCV RBC AUTO: 89.9 FL (ref 81–100)
MONOCYTES # BLD AUTO: 0.52 X10(3) UL (ref 0.1–1)
MONOCYTES NFR BLD AUTO: 8.3 %
NEUTROPHIL ABS PRELIM: 3.94 X10 (3) UL (ref 1.3–6.7)
NEUTROPHILS # BLD AUTO: 3.94 X10(3) UL (ref 1.3–6.7)
NEUTROPHILS NFR BLD AUTO: 62.4 %
NONHDLC SERPL-MCNC: 146 MG/DL (ref ?–130)
OSMOLALITY SERPL CALC.SUM OF ELEC: 283 MOSM/KG (ref 275–295)
PLATELET # BLD AUTO: 231 10(3)UL (ref 150–450)
POTASSIUM SERPL-SCNC: 4.4 MMOL/L (ref 3.6–5.1)
RBC # BLD AUTO: 4.35 X10(6)UL (ref 3.8–5.1)
RED CELL DISTRIBUTION WIDTH-SD: 43.1 FL (ref 35.1–46.3)
SODIUM SERPL-SCNC: 137 MMOL/L (ref 136–144)
T4 FREE SERPL-MCNC: 1.2 NG/DL (ref 0.9–1.8)
TRIGL SERPL-MCNC: 102 MG/DL (ref 30–149)
TSI SER-ACNC: 1.42 MIU/ML (ref 0.35–5.5)
VLDLC SERPL CALC-MCNC: 20 MG/DL (ref 0–30)
WBC # BLD AUTO: 6.3 X10(3) UL (ref 4–13)

## 2018-09-13 PROCEDURE — 84439 ASSAY OF FREE THYROXINE: CPT | Performed by: FAMILY MEDICINE

## 2018-09-13 PROCEDURE — 36415 COLL VENOUS BLD VENIPUNCTURE: CPT | Performed by: FAMILY MEDICINE

## 2018-09-13 PROCEDURE — 80061 LIPID PANEL: CPT | Performed by: FAMILY MEDICINE

## 2018-09-13 PROCEDURE — 80050 GENERAL HEALTH PANEL: CPT | Performed by: FAMILY MEDICINE

## 2018-09-13 NOTE — PROGRESS NOTES
Staff-- please place order for thyroid US due 3/2019    Edwardo Langston. Your needle biopsy of the right thyroid nodule is benign. I recommend repeat thyroid ultrasound in 6 months to make sure th nodule is not increasing in size.  I will have my staff place the or

## 2018-09-13 NOTE — PROGRESS NOTES
Attempted to contact pt. Per phone consent [phone] Cell. Left detailed message with test results. An order for US thyroid due 3/2019 was placed and copy was mailed to pt.

## 2018-09-21 ENCOUNTER — TELEPHONE (OUTPATIENT)
Dept: FAMILY MEDICINE CLINIC | Facility: CLINIC | Age: 53
End: 2018-09-21

## 2018-09-21 ENCOUNTER — OFFICE VISIT (OUTPATIENT)
Dept: FAMILY MEDICINE CLINIC | Facility: CLINIC | Age: 53
End: 2018-09-21
Payer: COMMERCIAL

## 2018-09-21 VITALS
DIASTOLIC BLOOD PRESSURE: 70 MMHG | HEIGHT: 67.75 IN | HEART RATE: 76 BPM | SYSTOLIC BLOOD PRESSURE: 120 MMHG | TEMPERATURE: 97 F | WEIGHT: 203 LBS | RESPIRATION RATE: 16 BRPM | BODY MASS INDEX: 31.12 KG/M2

## 2018-09-21 DIAGNOSIS — Z12.4 SCREENING FOR CERVICAL CANCER: ICD-10-CM

## 2018-09-21 DIAGNOSIS — M25.551 RIGHT HIP PAIN: ICD-10-CM

## 2018-09-21 DIAGNOSIS — Z00.00 ROUTINE MEDICAL EXAM: Primary | ICD-10-CM

## 2018-09-21 DIAGNOSIS — E78.49 OTHER HYPERLIPIDEMIA: ICD-10-CM

## 2018-09-21 DIAGNOSIS — Z23 NEED FOR INFLUENZA VACCINATION: ICD-10-CM

## 2018-09-21 PROCEDURE — 90471 IMMUNIZATION ADMIN: CPT | Performed by: FAMILY MEDICINE

## 2018-09-21 PROCEDURE — 90686 IIV4 VACC NO PRSV 0.5 ML IM: CPT | Performed by: FAMILY MEDICINE

## 2018-09-21 PROCEDURE — 87624 HPV HI-RISK TYP POOLED RSLT: CPT | Performed by: FAMILY MEDICINE

## 2018-09-21 PROCEDURE — 99396 PREV VISIT EST AGE 40-64: CPT | Performed by: FAMILY MEDICINE

## 2018-09-21 PROCEDURE — 99213 OFFICE O/P EST LOW 20 MIN: CPT | Performed by: FAMILY MEDICINE

## 2018-09-21 PROCEDURE — 88175 CYTOPATH C/V AUTO FLUID REDO: CPT | Performed by: FAMILY MEDICINE

## 2018-09-21 RX ORDER — FLUTICASONE PROPIONATE 50 MCG
2 SPRAY, SUSPENSION (ML) NASAL DAILY
COMMUNITY
End: 2019-11-22

## 2018-09-21 NOTE — PROGRESS NOTES
Patient presents with:  Physical: Form she needs completed for insurance. Pap: Request Flu vaccine today. Lab Results: done 09/13/18      HPI:  Tex Hashimoto is a 46year old female here today for preventative visit. Imms- open to flu shot today.  Up SINUS ENDOSCOPY ETHM MAX; Bilateral      Comment:  Performed by Corrinne Honour, MD at [facility] MAIN OR  2000:   2006: [address];  Bilateral      Comment:  History of Rotator Cuff repair    Current Outpatient Medications on File Prior to Vi Narrative      Not on file    Family History   Problem Relation Age of Onset   • Allergies Brother    • Allergies Mother    • Other (Nasal Polyps) Brother    • Other (Osteoporosis) Maternal Grandmother    • Other (Osteoporosis) Paternal Grandmother    • Hy INFLUENZA VACCINE QUAD PRESERVATIVE FREE 0.5 ML    3. Screening for cervical cancer  - THINPREP PAP SMEAR B; Future  - HPV HIGH RISK , THIN PREP COLLECTION; Future    4. Other hyperlipidemia  - LIPID PANEL; Future  -discussed diet/exercise today    5.  Melanieh

## 2018-09-21 NOTE — TELEPHONE ENCOUNTER
Patient brought in a Biometric Screening Form to be completed at today's office visit. Form was completed by  today and the original copy was given back to patient, copy placed in the green Triage folder & copy sent to scan.

## 2018-09-23 LAB — HPV I/H RISK 1 DNA SPEC QL NAA+PROBE: NEGATIVE

## 2018-10-01 ENCOUNTER — MED REC SCAN ONLY (OUTPATIENT)
Dept: FAMILY MEDICINE CLINIC | Facility: CLINIC | Age: 53
End: 2018-10-01

## 2018-12-20 ENCOUNTER — PATIENT MESSAGE (OUTPATIENT)
Dept: FAMILY MEDICINE CLINIC | Facility: CLINIC | Age: 53
End: 2018-12-20

## 2018-12-20 NOTE — TELEPHONE ENCOUNTER
From: Wendy Barroso  To: Checo cAosta MD  Sent: 12/20/2018 1:38 PM CST  Subject: Other    I have an arthroscopic surgery date scheduled with Dr Jomar Womack on 1/23/19 for my right hip. He is going to repair a couple tendon tears and a small labrum tear.  I

## 2019-01-07 ENCOUNTER — OFFICE VISIT (OUTPATIENT)
Dept: FAMILY MEDICINE CLINIC | Facility: CLINIC | Age: 54
End: 2019-01-07
Payer: COMMERCIAL

## 2019-01-07 ENCOUNTER — LAB ENCOUNTER (OUTPATIENT)
Dept: LAB | Age: 54
End: 2019-01-07
Attending: FAMILY MEDICINE
Payer: COMMERCIAL

## 2019-01-07 VITALS
HEART RATE: 64 BPM | SYSTOLIC BLOOD PRESSURE: 118 MMHG | RESPIRATION RATE: 16 BRPM | TEMPERATURE: 99 F | DIASTOLIC BLOOD PRESSURE: 70 MMHG | HEIGHT: 68 IN | WEIGHT: 209 LBS | BODY MASS INDEX: 31.67 KG/M2

## 2019-01-07 DIAGNOSIS — Z01.818 PRE-OPERATIVE EXAMINATION: ICD-10-CM

## 2019-01-07 DIAGNOSIS — S73.191D TEAR OF RIGHT ACETABULAR LABRUM, SUBSEQUENT ENCOUNTER: ICD-10-CM

## 2019-01-07 DIAGNOSIS — S73.191D TEAR OF RIGHT ACETABULAR LABRUM, SUBSEQUENT ENCOUNTER: Primary | ICD-10-CM

## 2019-01-07 LAB
ALBUMIN SERPL-MCNC: 4 G/DL (ref 3.1–4.5)
ALBUMIN/GLOB SERPL: 1 {RATIO} (ref 1–2)
ALP LIVER SERPL-CCNC: 65 U/L (ref 41–108)
ALT SERPL-CCNC: 23 U/L (ref 14–54)
ANION GAP SERPL CALC-SCNC: 8 MMOL/L (ref 0–18)
AST SERPL-CCNC: 19 U/L (ref 15–41)
BASOPHILS # BLD AUTO: 0.04 X10(3) UL (ref 0–0.1)
BASOPHILS NFR BLD AUTO: 0.6 %
BILIRUB SERPL-MCNC: 0.5 MG/DL (ref 0.1–2)
BILIRUB UR QL STRIP.AUTO: NEGATIVE
BUN BLD-MCNC: 12 MG/DL (ref 8–20)
BUN/CREAT SERPL: 13.6 (ref 10–20)
CALCIUM BLD-MCNC: 9.1 MG/DL (ref 8.3–10.3)
CHLORIDE SERPL-SCNC: 104 MMOL/L (ref 101–111)
CLARITY UR REFRACT.AUTO: CLEAR
CO2 SERPL-SCNC: 27 MMOL/L (ref 22–32)
COLOR UR AUTO: COLORLESS
CREAT BLD-MCNC: 0.88 MG/DL (ref 0.55–1.02)
EOSINOPHIL # BLD AUTO: 0.16 X10(3) UL (ref 0–0.3)
EOSINOPHIL NFR BLD AUTO: 2.2 %
ERYTHROCYTE [DISTWIDTH] IN BLOOD BY AUTOMATED COUNT: 13.7 % (ref 11.5–16)
GLOBULIN PLAS-MCNC: 4.1 G/DL (ref 2.8–4.4)
GLUCOSE BLD-MCNC: 86 MG/DL (ref 70–99)
GLUCOSE UR STRIP.AUTO-MCNC: NEGATIVE MG/DL
HCT VFR BLD AUTO: 37.3 % (ref 34–50)
HGB BLD-MCNC: 12.4 G/DL (ref 12–16)
IMMATURE GRANULOCYTE COUNT: 0.01 X10(3) UL (ref 0–1)
IMMATURE GRANULOCYTE RATIO %: 0.1 %
INR BLD: 0.93 (ref 0.9–1.1)
KETONES UR STRIP.AUTO-MCNC: NEGATIVE MG/DL
LEUKOCYTE ESTERASE UR QL STRIP.AUTO: NEGATIVE
LYMPHOCYTES # BLD AUTO: 2.32 X10(3) UL (ref 0.9–4)
LYMPHOCYTES NFR BLD AUTO: 32.4 %
M PROTEIN MFR SERPL ELPH: 8.1 G/DL (ref 6.4–8.2)
MCH RBC QN AUTO: 29.3 PG (ref 27–33.2)
MCHC RBC AUTO-ENTMCNC: 33.2 G/DL (ref 31–37)
MCV RBC AUTO: 88.2 FL (ref 81–100)
MONOCYTES # BLD AUTO: 0.57 X10(3) UL (ref 0.1–1)
MONOCYTES NFR BLD AUTO: 7.9 %
NEUTROPHIL ABS PRELIM: 4.07 X10 (3) UL (ref 1.3–6.7)
NEUTROPHILS # BLD AUTO: 4.07 X10(3) UL (ref 1.3–6.7)
NEUTROPHILS NFR BLD AUTO: 56.8 %
NITRITE UR QL STRIP.AUTO: NEGATIVE
OSMOLALITY SERPL CALC.SUM OF ELEC: 287 MOSM/KG (ref 275–295)
PH UR STRIP.AUTO: 7 [PH] (ref 4.5–8)
PLATELET # BLD AUTO: 228 10(3)UL (ref 150–450)
POTASSIUM SERPL-SCNC: 4.6 MMOL/L (ref 3.6–5.1)
PROT UR STRIP.AUTO-MCNC: NEGATIVE MG/DL
PSA SERPL DL<=0.01 NG/ML-MCNC: 12.9 SECONDS (ref 12.4–14.7)
RBC # BLD AUTO: 4.23 X10(6)UL (ref 3.8–5.1)
RBC UR QL AUTO: NEGATIVE
RED CELL DISTRIBUTION WIDTH-SD: 44 FL (ref 35.1–46.3)
SODIUM SERPL-SCNC: 139 MMOL/L (ref 136–144)
SP GR UR STRIP.AUTO: 1.01 (ref 1–1.03)
UROBILINOGEN UR STRIP.AUTO-MCNC: <2 MG/DL
WBC # BLD AUTO: 7.2 X10(3) UL (ref 4–13)

## 2019-01-07 PROCEDURE — 99213 OFFICE O/P EST LOW 20 MIN: CPT | Performed by: FAMILY MEDICINE

## 2019-01-07 PROCEDURE — 93000 ELECTROCARDIOGRAM COMPLETE: CPT | Performed by: FAMILY MEDICINE

## 2019-01-07 PROCEDURE — 85610 PROTHROMBIN TIME: CPT | Performed by: FAMILY MEDICINE

## 2019-01-07 PROCEDURE — 81003 URINALYSIS AUTO W/O SCOPE: CPT | Performed by: FAMILY MEDICINE

## 2019-01-07 PROCEDURE — 85730 THROMBOPLASTIN TIME PARTIAL: CPT | Performed by: FAMILY MEDICINE

## 2019-01-07 PROCEDURE — 85025 COMPLETE CBC W/AUTO DIFF WBC: CPT | Performed by: FAMILY MEDICINE

## 2019-01-07 PROCEDURE — 36415 COLL VENOUS BLD VENIPUNCTURE: CPT | Performed by: FAMILY MEDICINE

## 2019-01-07 PROCEDURE — 80053 COMPREHEN METABOLIC PANEL: CPT | Performed by: FAMILY MEDICINE

## 2019-01-07 RX ORDER — AZELASTINE 1 MG/ML
SPRAY, METERED NASAL
Refills: 3 | COMMUNITY
Start: 2018-12-21 | End: 2019-06-29 | Stop reason: ALTCHOICE

## 2019-01-07 RX ORDER — LEVOTHYROXINE SODIUM 112 UG/1
TABLET ORAL
Refills: 3 | COMMUNITY
Start: 2018-12-20 | End: 2019-09-24

## 2019-01-07 NOTE — PROGRESS NOTES
Patient presents with:  Pre-Op Exam: Right Hip Arthroscopy Labral Repair scheduled for 01/23/19 with Dr. Shaylee Narayanan at the [facility] in [facility]. HPI:  Ha Frances is a 48year old female here today for pre-op visit.       Pre-Op Exam: Azelastine HCl 0.1 % Nasal Solution USE 2 SPRAYS IN EACH NOSTRIL BID Disp:  Rfl: 3   Fluticasone Propionate (FLONASE) 50 MCG/ACT Nasal Suspension 2 sprays by Each Nare route daily.  Disp:  Rfl:    Multiple Vitamins-Minerals (MULTI VITAMIN/MINERALS) Oral T Disorders Associated Paternal Grandfather    • Bleeding Disorders Neg    • Anesthesia Problems  Neg        Review of Systems - All systems reviewed and negative except for HPI    PHYSICAL EXAM:  /70   Pulse 64   Temp 98.6 °F (37 °C) (Temporal)   Resp

## 2019-01-08 LAB — APTT PPP: 29.3 SECONDS (ref 26.1–34.6)

## 2019-01-10 ENCOUNTER — TELEPHONE (OUTPATIENT)
Dept: FAMILY MEDICINE CLINIC | Facility: CLINIC | Age: 54
End: 2019-01-10

## 2019-01-10 NOTE — TELEPHONE ENCOUNTER
----- Message from Dorrie Gitelman, MD sent at 1/9/2019  6:03 PM CST -----  Nursing- Normal labs. Please inform patient and let her know we will fax all her paperwork to surgeon. Lisa Davis- note addended, please fax paperwork  Thank you ladies!

## 2019-01-10 NOTE — TELEPHONE ENCOUNTER
Called & spoke to patient and informed her that her lab results from 01/07/19 are normal and that medical clearance office notes along with a copy of lab & EKG reports all from 01/07/19 were faxed to Dr. Fide Carvajal office today.

## 2019-01-10 NOTE — TELEPHONE ENCOUNTER
Right Hip Arthroscopy Labral Repair scheduled for 01/23/19 with Dr. Shaylee Narayanan at the [facility] in [facility].   Medical clearance office visit notes, lab & EKG results all from 01/07/19 faxed to Dr. Kasey Mendoza office ATTN: Vanessa Hinojosa, fax # [phone]

## 2019-02-26 ENCOUNTER — TELEPHONE (OUTPATIENT)
Dept: FAMILY MEDICINE CLINIC | Facility: CLINIC | Age: 54
End: 2019-02-26

## 2019-02-26 DIAGNOSIS — Z12.39 SCREENING FOR MALIGNANT NEOPLASM OF BREAST: Primary | ICD-10-CM

## 2019-02-26 NOTE — TELEPHONE ENCOUNTER
Brazil from [facility] is calling because of an  order:    MAHENDRA SCREENING ADRIANNA (CPT=77067) [facility]) [[id number]] (Order [id number])     She says she would need MD to put in a new one. Any questions you can call Nora at [phone].

## 2019-03-09 ENCOUNTER — HOSPITAL ENCOUNTER (OUTPATIENT)
Dept: MAMMOGRAPHY | Age: 54
Discharge: HOME | End: 2019-03-09
Attending: FAMILY MEDICINE
Payer: COMMERCIAL

## 2019-03-09 DIAGNOSIS — Z12.39 SCREENING FOR MALIGNANT NEOPLASM OF BREAST: ICD-10-CM

## 2019-03-09 PROCEDURE — 77063 BREAST TOMOSYNTHESIS BI: CPT | Performed by: FAMILY MEDICINE

## 2019-03-09 PROCEDURE — 77067 SCR MAMMO BI INCL CAD: CPT | Performed by: FAMILY MEDICINE

## 2019-03-11 ENCOUNTER — PATIENT MESSAGE (OUTPATIENT)
Dept: FAMILY MEDICINE CLINIC | Facility: CLINIC | Age: 54
End: 2019-03-11

## 2019-03-11 NOTE — PROGRESS NOTES
Advised pt that additional views of left breast were recommended.  Provided information below    Mammogram dept [phone] for [facility]

## 2019-03-11 NOTE — TELEPHONE ENCOUNTER
From: Pita Avalos  To: Brown Dinero MD  Sent: 3/11/2019 11:58 AM CDT  Subject: Test Results Question    What additional breast screening do I need to schedule?  I have a bunch of travel scheduled in the coming weeks and want to get this on my patricia

## 2019-03-21 ENCOUNTER — HOSPITAL ENCOUNTER (OUTPATIENT)
Dept: MAMMOGRAPHY | Facility: HOSPITAL | Age: 54
Discharge: HOME | End: 2019-03-21
Attending: FAMILY MEDICINE
Payer: COMMERCIAL

## 2019-03-21 DIAGNOSIS — R92.2 INCONCLUSIVE MAMMOGRAM: ICD-10-CM

## 2019-03-21 PROCEDURE — 76642 ULTRASOUND BREAST LIMITED: CPT | Performed by: FAMILY MEDICINE

## 2019-03-21 PROCEDURE — 77065 DX MAMMO INCL CAD UNI: CPT | Performed by: FAMILY MEDICINE

## 2019-03-21 PROCEDURE — 77061 BREAST TOMOSYNTHESIS UNI: CPT | Performed by: FAMILY MEDICINE

## 2019-04-04 ENCOUNTER — PATIENT MESSAGE (OUTPATIENT)
Dept: FAMILY MEDICINE CLINIC | Facility: CLINIC | Age: 54
End: 2019-04-04

## 2019-04-04 NOTE — PROGRESS NOTES
[facility] [address] [place] [address], [address] [place] [address]    To schedule an appointment, call [phone].     [facility] travel clinic information above provided to patient

## 2019-04-04 NOTE — TELEPHONE ENCOUNTER
From: Raisa Meek  To: Gayle Decker MD  Sent: 4/4/2019 7:35 AM CDT  Subject: Non-Urgent Medical Question    I will be traveling to [place] for a week in June. Do you recommend any vaccines?  My company travel provider says maybe Hep A and Typh

## 2019-05-20 ENCOUNTER — HOSPITAL ENCOUNTER (OUTPATIENT)
Dept: ULTRASOUND IMAGING | Age: 54
Discharge: HOME | End: 2019-05-20
Attending: OTOLARYNGOLOGY
Payer: COMMERCIAL

## 2019-05-20 DIAGNOSIS — E04.1 THYROID NODULE: ICD-10-CM

## 2019-05-20 PROCEDURE — 76536 US EXAM OF HEAD AND NECK: CPT | Performed by: OTOLARYNGOLOGY

## 2019-05-21 NOTE — PROGRESS NOTES
Staff-- place order for thyroid US due 5/2020 and TSH, free T4 due 9/2019. Mail copy of orders to patient      [facility]. Your thyroid ultrasound shows 2 stable nodules. I recommend repeating a thyroid ultrasound in 1 year- May 2020.  You will be due for thyr

## 2019-05-21 NOTE — PROGRESS NOTES
Pt reviewed results in [facility].  Order for US thyroid due 5/2020 and labs due 9/2019 were placed and a copies mailed to pt

## 2019-06-29 ENCOUNTER — OFFICE VISIT (OUTPATIENT)
Dept: FAMILY MEDICINE CLINIC | Facility: CLINIC | Age: 54
End: 2019-06-29
Payer: COMMERCIAL

## 2019-06-29 VITALS
OXYGEN SATURATION: 99 % | DIASTOLIC BLOOD PRESSURE: 84 MMHG | RESPIRATION RATE: 22 BRPM | HEART RATE: 60 BPM | WEIGHT: 208 LBS | SYSTOLIC BLOOD PRESSURE: 134 MMHG | BODY MASS INDEX: 31.52 KG/M2 | TEMPERATURE: 98 F | HEIGHT: 68 IN

## 2019-06-29 DIAGNOSIS — R42 VERTIGO: Primary | ICD-10-CM

## 2019-06-29 PROCEDURE — 99213 OFFICE O/P EST LOW 20 MIN: CPT | Performed by: PHYSICIAN ASSISTANT

## 2019-06-29 RX ORDER — ONDANSETRON 8 MG/1
8 TABLET, ORALLY DISINTEGRATING ORAL EVERY 8 HOURS PRN
Qty: 20 TABLET | Refills: 0 | Status: SHIPPED | OUTPATIENT
Start: 2019-06-29 | End: 2019-07-11

## 2019-06-29 RX ORDER — MECLIZINE HYDROCHLORIDE 25 MG/1
25 TABLET ORAL 3 TIMES DAILY PRN
Qty: 21 TABLET | Refills: 0 | Status: SHIPPED | OUTPATIENT
Start: 2019-06-29 | End: 2019-07-06

## 2019-06-29 NOTE — PROGRESS NOTES
CHIEF COMPLAINT:     Patient presents with:  Vertigo: X 5 days, Tried OTC medication feels worse today   :      HPI:     Ángel Cohen is a 48year old female presents with complaints of dizziness for 5 days. Spinning sensation. Feels off balance.  Worse wi week, never a problem    Drug use: No       REVIEW OF SYSTEMS:   GENERAL: no Weakness. no Syncope. no Falling. Denies fever, chills,weight change, decreased appetite. SKIN: Denies rashes, skin wounds or ulcers.   EYES: Denies blurred vision or double v imaging/labs/EKG. Patient declines ED visit. Will try trial of oral medication.  ED for worsening symptoms   Rest   Push fluids   Slow position changes   No driving   Nausea medication as needed every 8 hours   If symptoms persist please follow up with PCP

## 2019-06-29 NOTE — PATIENT INSTRUCTIONS
Rest   Push fluids   Slow position changes   No driving   Nausea medication as needed every 8 hours   If symptoms persist please follow up with PCP  ED for worsening sympotms

## 2019-07-01 ENCOUNTER — MED REC SCAN ONLY (OUTPATIENT)
Dept: FAMILY MEDICINE CLINIC | Facility: CLINIC | Age: 54
End: 2019-07-01

## 2019-07-02 ENCOUNTER — TELEPHONE (OUTPATIENT)
Dept: FAMILY MEDICINE CLINIC | Facility: CLINIC | Age: 54
End: 2019-07-02

## 2019-07-02 ENCOUNTER — OFFICE VISIT (OUTPATIENT)
Dept: FAMILY MEDICINE CLINIC | Facility: CLINIC | Age: 54
End: 2019-07-02
Payer: COMMERCIAL

## 2019-07-02 VITALS
WEIGHT: 211.25 LBS | RESPIRATION RATE: 16 BRPM | HEART RATE: 71 BPM | BODY MASS INDEX: 32.02 KG/M2 | SYSTOLIC BLOOD PRESSURE: 128 MMHG | TEMPERATURE: 99 F | OXYGEN SATURATION: 99 % | DIASTOLIC BLOOD PRESSURE: 70 MMHG | HEIGHT: 68 IN

## 2019-07-02 DIAGNOSIS — R42 DIZZINESS: Primary | ICD-10-CM

## 2019-07-02 PROCEDURE — 99214 OFFICE O/P EST MOD 30 MIN: CPT | Performed by: FAMILY MEDICINE

## 2019-07-02 NOTE — TELEPHONE ENCOUNTER
VESTIBULAR THERAPY FOR DIZZINESS, POSSIBLE VERTIGO.      EVALUATE AND TREAT        [facility]  Address: [address] [place] [address]  Phone: [phone]  Appt scheduled for 7/3/19 at [address] [place] [address] with Sanjeev Mensah Physical therapist.  Conner Escalona

## 2019-07-02 NOTE — PROGRESS NOTES
HPI:   Alexandria Walker is a 48year old female that presents for vertigo x 1 week. She had this before and had extensive testing and was informed she had inner ear infection.  She went to walk-in clinic on 6/29 and was given meclizine and zofran to manage her calculated from the following:    Height as of this encounter: 68\". Weight as of this encounter: 211 lb 4 oz. Vital signs reviewed. Appears stated age, well groomed.   Physical Exam:  GEN:  Patient is alert, awake and oriented, well developed, well nou

## 2019-07-09 ENCOUNTER — PATIENT MESSAGE (OUTPATIENT)
Dept: FAMILY MEDICINE CLINIC | Facility: CLINIC | Age: 54
End: 2019-07-09

## 2019-07-10 ENCOUNTER — PATIENT MESSAGE (OUTPATIENT)
Dept: FAMILY MEDICINE CLINIC | Facility: CLINIC | Age: 54
End: 2019-07-10

## 2019-07-10 NOTE — PROGRESS NOTES
LOV:7/2/19  1. Dizziness  - PHYSICAL THERAPY EXTERNAL     Patient could have vertigo versus dizziness due to middle ear effusion. Will have patient see vestibular clinic and appointment made for tomorrow.   Patient may continue with Zofran and meclizine

## 2019-07-10 NOTE — TELEPHONE ENCOUNTER
From: Niki Yo  To: Anusha Ash MD  Sent: 7/10/2019 10:02 AM CDT  Subject: Visit Follow-up Question    Related to yesterday's message, could a get another Zofran prescription? I received one from the walk-in clinic but it is gone with no refills.

## 2019-07-10 NOTE — TELEPHONE ENCOUNTER
From: Je Flannery  To: Scot Colunga MD  Sent: 7/9/2019 6:08 PM CDT  Subject: Visit Follow-up Question    I have been to therapy for vertigo. She said mine seems a bit more complex to treat, but it is not as bad as when I started.  I still feel nauseas

## 2019-07-11 RX ORDER — ONDANSETRON 8 MG/1
8 TABLET, ORALLY DISINTEGRATING ORAL EVERY 8 HOURS PRN
Qty: 20 TABLET | Refills: 0 | Status: SHIPPED | OUTPATIENT
Start: 2019-07-11 | End: 2019-07-21

## 2019-09-18 ENCOUNTER — PATIENT MESSAGE (OUTPATIENT)
Dept: FAMILY MEDICINE CLINIC | Facility: CLINIC | Age: 54
End: 2019-09-18

## 2019-09-18 DIAGNOSIS — Z00.00 ROUTINE GENERAL MEDICAL EXAMINATION AT HEALTH CARE FACILITY: Primary | ICD-10-CM

## 2019-09-18 NOTE — TELEPHONE ENCOUNTER
From: Mariposa Aldrich  To: Bryanna Douglas MD  Sent: 9/18/2019 12:29 PM CDT  Subject: Other    Hello. If I need blood work with my scheduled physical next week, please put order in system and I will go this week yet.  Thx

## 2019-09-18 NOTE — PROGRESS NOTES
Lab orders placed  Patient informed via [employer]  Future Appointments   Date Time Provider aDli Moore   9/24/2019  7:00 AM Phu Coronado MD [facility] [address]

## 2019-09-21 ENCOUNTER — LAB ENCOUNTER (OUTPATIENT)
Dept: LAB | Age: 54
End: 2019-09-21
Attending: OTOLARYNGOLOGY
Payer: COMMERCIAL

## 2019-09-21 DIAGNOSIS — E04.2 MULTIPLE THYROID NODULES: ICD-10-CM

## 2019-09-21 DIAGNOSIS — E06.3 HYPOTHYROIDISM DUE TO HASHIMOTO'S THYROIDITIS: ICD-10-CM

## 2019-09-21 DIAGNOSIS — E03.8 HYPOTHYROIDISM DUE TO HASHIMOTO'S THYROIDITIS: ICD-10-CM

## 2019-09-21 DIAGNOSIS — Z00.00 ROUTINE GENERAL MEDICAL EXAMINATION AT HEALTH CARE FACILITY: ICD-10-CM

## 2019-09-21 LAB
ALBUMIN SERPL-MCNC: 3.7 G/DL (ref 3.4–5)
ALBUMIN/GLOB SERPL: 1 {RATIO} (ref 1–2)
ALP LIVER SERPL-CCNC: 58 U/L (ref 41–108)
ALT SERPL-CCNC: 19 U/L (ref 13–56)
ANION GAP SERPL CALC-SCNC: 4 MMOL/L (ref 0–18)
AST SERPL-CCNC: 15 U/L (ref 15–37)
BASOPHILS # BLD AUTO: 0.07 X10(3) UL (ref 0–0.2)
BASOPHILS NFR BLD AUTO: 1 %
BILIRUB SERPL-MCNC: 0.6 MG/DL (ref 0.1–2)
BUN BLD-MCNC: 11 MG/DL (ref 7–18)
BUN/CREAT SERPL: 11.2 (ref 10–20)
CALCIUM BLD-MCNC: 8.6 MG/DL (ref 8.5–10.1)
CHLORIDE SERPL-SCNC: 106 MMOL/L (ref 98–112)
CHOLEST SMN-MCNC: 207 MG/DL (ref ?–200)
CO2 SERPL-SCNC: 27 MMOL/L (ref 21–32)
CREAT BLD-MCNC: 0.98 MG/DL (ref 0.55–1.02)
DEPRECATED RDW RBC AUTO: 43.9 FL (ref 35.1–46.3)
EOSINOPHIL # BLD AUTO: 0.12 X10(3) UL (ref 0–0.7)
EOSINOPHIL NFR BLD AUTO: 1.7 %
ERYTHROCYTE [DISTWIDTH] IN BLOOD BY AUTOMATED COUNT: 13.5 % (ref 11–15)
GLOBULIN PLAS-MCNC: 3.7 G/DL (ref 2.8–4.4)
GLUCOSE BLD-MCNC: 88 MG/DL (ref 70–99)
HCT VFR BLD AUTO: 40.2 % (ref 35–48)
HDLC SERPL-MCNC: 48 MG/DL (ref 40–59)
HGB BLD-MCNC: 12.7 G/DL (ref 12–16)
IMM GRANULOCYTES # BLD AUTO: 0.01 X10(3) UL (ref 0–1)
IMM GRANULOCYTES NFR BLD: 0.1 %
LDLC SERPL CALC-MCNC: 136 MG/DL (ref ?–100)
LYMPHOCYTES # BLD AUTO: 1.69 X10(3) UL (ref 1–4)
LYMPHOCYTES NFR BLD AUTO: 24.5 %
M PROTEIN MFR SERPL ELPH: 7.4 G/DL (ref 6.4–8.2)
MCH RBC QN AUTO: 28 PG (ref 26–34)
MCHC RBC AUTO-ENTMCNC: 31.6 G/DL (ref 31–37)
MCV RBC AUTO: 88.5 FL (ref 80–100)
MONOCYTES # BLD AUTO: 0.57 X10(3) UL (ref 0.1–1)
MONOCYTES NFR BLD AUTO: 8.3 %
NEUTROPHILS # BLD AUTO: 4.43 X10 (3) UL (ref 1.5–7.7)
NEUTROPHILS # BLD AUTO: 4.43 X10(3) UL (ref 1.5–7.7)
NEUTROPHILS NFR BLD AUTO: 64.4 %
NONHDLC SERPL-MCNC: 159 MG/DL (ref ?–130)
OSMOLALITY SERPL CALC.SUM OF ELEC: 283 MOSM/KG (ref 275–295)
PLATELET # BLD AUTO: 221 10(3)UL (ref 150–450)
POTASSIUM SERPL-SCNC: 4.6 MMOL/L (ref 3.5–5.1)
RBC # BLD AUTO: 4.54 X10(6)UL (ref 3.8–5.3)
SODIUM SERPL-SCNC: 137 MMOL/L (ref 136–145)
T4 FREE SERPL-MCNC: 0.9 NG/DL (ref 0.8–1.7)
TRIGL SERPL-MCNC: 113 MG/DL (ref 30–149)
TSI SER-ACNC: 1.1 MIU/ML (ref 0.36–3.74)
VLDLC SERPL CALC-MCNC: 23 MG/DL (ref 0–30)
WBC # BLD AUTO: 6.9 X10(3) UL (ref 4–11)

## 2019-09-21 PROCEDURE — 84439 ASSAY OF FREE THYROXINE: CPT

## 2019-09-21 PROCEDURE — 80061 LIPID PANEL: CPT

## 2019-09-21 PROCEDURE — 36415 COLL VENOUS BLD VENIPUNCTURE: CPT

## 2019-09-21 PROCEDURE — 84443 ASSAY THYROID STIM HORMONE: CPT

## 2019-09-21 PROCEDURE — 85025 COMPLETE CBC W/AUTO DIFF WBC: CPT

## 2019-09-21 PROCEDURE — 80053 COMPREHEN METABOLIC PANEL: CPT

## 2019-09-22 NOTE — PROGRESS NOTES
Staff--  Place order for TSH, free T4 due 9/2020    Edwardo Hunter. Your thyroid labs look good. I recommend you continue your current dose of levothyroxine and I recommend repeating thyroid labs in 1 year (9/2020). Please let me know if you have any questions.

## 2019-09-23 NOTE — PROGRESS NOTES
Patient presents with:  Physical: brought in Biometric screening Form to be completed. Influenza vaccine today  Lab Results: done 9/21/19      HPI:  Lady Montilla is a 48year old female here today for preventative visit.         Imms- will discuss td, flu, benign, Dr. Dangelo Rom   • COLONOSCOPY  03/09/2017    repeat in 10 yrs, Dr. Gunner Brothers, [facility] LEFT  2004   • HERNIA SURGERY      umbilical hernia stitched repair   • NASAL SEPTOPLASTY BILAT SINUS ENDOSCOPY ETHM MAX Bilateral 12 connections:        Talks on phone: Not on file        Gets together: Not on file        Attends Tenriism service: Not on file        Active member of club or organization: Not on file        Attends meetings of clubs or organizations: Not on file turbinates. Mouth/Throat- Normal oral mucosa, throat non-erythematous. NECK:  No submental, submandibular, ant/post cervical lymphadenopathy. No thyromegaly or masses. PULMONARY:  Lungs clear to auscultation bilaterally. No wheezes, rales, or rhonchi.

## 2019-09-24 ENCOUNTER — OFFICE VISIT (OUTPATIENT)
Dept: FAMILY MEDICINE CLINIC | Facility: CLINIC | Age: 54
End: 2019-09-24
Payer: COMMERCIAL

## 2019-09-24 ENCOUNTER — TELEPHONE (OUTPATIENT)
Dept: FAMILY MEDICINE CLINIC | Facility: CLINIC | Age: 54
End: 2019-09-24

## 2019-09-24 ENCOUNTER — PATIENT MESSAGE (OUTPATIENT)
Dept: FAMILY MEDICINE CLINIC | Facility: CLINIC | Age: 54
End: 2019-09-24

## 2019-09-24 VITALS
HEART RATE: 68 BPM | SYSTOLIC BLOOD PRESSURE: 122 MMHG | WEIGHT: 210 LBS | HEIGHT: 68 IN | TEMPERATURE: 98 F | BODY MASS INDEX: 31.83 KG/M2 | DIASTOLIC BLOOD PRESSURE: 80 MMHG | RESPIRATION RATE: 16 BRPM

## 2019-09-24 DIAGNOSIS — Z12.39 BREAST CANCER SCREENING: ICD-10-CM

## 2019-09-24 DIAGNOSIS — E03.9 HYPOTHYROIDISM, ACQUIRED: ICD-10-CM

## 2019-09-24 DIAGNOSIS — E66.9 OBESITY (BMI 30-39.9): ICD-10-CM

## 2019-09-24 DIAGNOSIS — Z00.00 ROUTINE MEDICAL EXAM: Primary | ICD-10-CM

## 2019-09-24 DIAGNOSIS — L20.9 ATOPIC DERMATITIS, MILD: Primary | ICD-10-CM

## 2019-09-24 DIAGNOSIS — Z23 NEED FOR VACCINATION: ICD-10-CM

## 2019-09-24 PROBLEM — H81.10 BPPV (BENIGN PAROXYSMAL POSITIONAL VERTIGO): Status: ACTIVE | Noted: 2019-09-24

## 2019-09-24 PROCEDURE — 99396 PREV VISIT EST AGE 40-64: CPT | Performed by: FAMILY MEDICINE

## 2019-09-24 PROCEDURE — 90686 IIV4 VACC NO PRSV 0.5 ML IM: CPT | Performed by: FAMILY MEDICINE

## 2019-09-24 PROCEDURE — 90471 IMMUNIZATION ADMIN: CPT | Performed by: FAMILY MEDICINE

## 2019-09-24 PROCEDURE — 90714 TD VACC NO PRESV 7 YRS+ IM: CPT | Performed by: FAMILY MEDICINE

## 2019-09-24 PROCEDURE — 90472 IMMUNIZATION ADMIN EACH ADD: CPT | Performed by: FAMILY MEDICINE

## 2019-09-24 RX ORDER — PHENTERMINE HYDROCHLORIDE 30 MG/1
30 CAPSULE ORAL EVERY MORNING
Qty: 30 CAPSULE | Refills: 0 | Status: SHIPPED | OUTPATIENT
Start: 2019-09-24 | End: 2019-10-24

## 2019-09-24 RX ORDER — LEVOTHYROXINE SODIUM 112 UG/1
112 TABLET ORAL
Qty: 90 TABLET | Refills: 3 | Status: SHIPPED | OUTPATIENT
Start: 2019-09-24 | End: 2020-11-09

## 2019-09-24 NOTE — TELEPHONE ENCOUNTER
From: Raisa Meek  To: Gayle Decker MD  Sent: 9/24/2019 1:12 PM CDT  Subject: Visit Follow-up Question    Two items, please:  1. Dr Desai Sender mentioned a cream for eczema for my ear. I did not see a prescription for that.    2. Delroy sent a form

## 2019-09-24 NOTE — PATIENT INSTRUCTIONS
Understanding USDA MyPlate  The USDA (U.S. Department of Agriculture) has guidelines to help you make healthy food choices. These are called MyPlate. MyPlate shows the food groups that make up healthy meals using the image of a place setting.  Before you · Oils. These are fats that are liquid at room temperature. They include canola, corn, olive, soybean, and sunflower oil. Foods that are mainly oil include mayonnaise, certain salad dressings, and soft margarines.  You should have only 5 to 7 teaspoons of o · Use non-sugar sweeteners. Stevia, aspartame, and sucralose can satisfy a sweet tooth without adding calories. · Swap out sugar-filled soda and other drinks. Buy sugar-free or low-calorie beverages. Remember water is always the best choice.   · Read label Microwaves cook very quickly. So most of the nutrients in the foods you’re cooking don’t have time to escape. Read the cooking directions carefully. It’s easy to overcook foods.  Use the microwave to cook baked potatoes or winter squash. You can also reheat In poaching, the food is covered with liquid. This could be broth, water, milk, or wine. The food is then gently simmered until done. Poaching uses less liquid than steaming or boiling. This means that delicate flavors are less diluted.  Poaching works well · To make salad dressings: Use nonfat yogurt or low-fat buttermilk. · In place of 1 whole egg in recipes: Use 2 egg whites or 1/4 cup egg substitute. · In place of regular cheese: Use fat-free or reduced-fat cheese.   Date Last Reviewed: 6/1/2017 © 2000- Fat is an energy source that can be stored until needed. Fat does not raise blood sugar. However, it can raise blood cholesterol, increasing the risk of heart disease. Fat is also high in calories, which can cause weight gain.  Not all types of fat are the · Animal protein is found in fish, poultry, meat, cheese, milk, and eggs. These contain cholesterol and can be high in saturated fat. Aim for lean, lower-fat choices. Don't have fried foods.   Date Last Reviewed: 3/1/2016  © 4766-5501 The [employer], © 5011-0910 [employer] [address]. [address] [facility], [address] [place]. All rights reserved. This information is not intended as a substitute for professional medical care. Always follow your healthcare professional's instructions.         Healthy © 3748-0997 [employer] [address]. [address] [facility], [address] [place]. All rights reserved. This information is not intended as a substitute for professional medical care. Always follow your healthcare professional's instructions.         More Ti © 4863-9305 [employer] [address]. [address] [facility] [place], [address]. All rights reserved. This information is not intended as a substitute for professional medical care. Always follow your healthcare professional's instructions.         Eating · Ask for sauces and salad dressings on the side and use just a tablespoon. Ask for low-fat dressings. · Try starting your meal with a bowl of vegetable soup or a salad.  This may help to prevent you from overeating during the meal.  · Order meat, poultry, Side of fries Baked potato, cup of soup, or small salad with dressing on the side   Soda or milkshake Carton of fat-free or low-fat milk, water, unsweetened tea, sparkling water, or other drinks with no added sugars   Burrito or taco A lean-meat or all-veg

## 2019-09-24 NOTE — PROGRESS NOTES
FYI: Pt reviewed results in MyChart and will discuss them with PCP during NOV 10/24 per PCP note.  Order for labs due 9/2020 was placed

## 2019-10-23 NOTE — PROGRESS NOTES
[facility] Visit Note  10/23/2019      Subjective:      Patient ID: Shree Wellington is a 47year old female. Chief Complaint:  Patient presents with:  Weight Check: Start of month 2 on Phentermine, down 7 lbs.  States she has continued to walk 2- the UE B, mostly full ROM of the neck except slightly reduced with L abduction. Pain with neck extension, R abduction and rotation to the R. No pain with palpation of the cervical spine/paraspinal areas        Assessment:     1.  Obesity (BMI 30-39.9)  - Ph

## 2019-10-24 ENCOUNTER — OFFICE VISIT (OUTPATIENT)
Dept: FAMILY MEDICINE CLINIC | Facility: CLINIC | Age: 54
End: 2019-10-24
Payer: COMMERCIAL

## 2019-10-24 VITALS
TEMPERATURE: 98 F | SYSTOLIC BLOOD PRESSURE: 122 MMHG | WEIGHT: 203 LBS | HEART RATE: 72 BPM | BODY MASS INDEX: 30.77 KG/M2 | RESPIRATION RATE: 16 BRPM | HEIGHT: 68 IN | DIASTOLIC BLOOD PRESSURE: 78 MMHG

## 2019-10-24 DIAGNOSIS — E66.9 OBESITY (BMI 30-39.9): ICD-10-CM

## 2019-10-24 DIAGNOSIS — M54.2 NECK PAIN, ACUTE: Primary | ICD-10-CM

## 2019-10-24 PROCEDURE — 99213 OFFICE O/P EST LOW 20 MIN: CPT | Performed by: FAMILY MEDICINE

## 2019-10-24 RX ORDER — PHENTERMINE HYDROCHLORIDE 30 MG/1
30 CAPSULE ORAL EVERY MORNING
Qty: 30 CAPSULE | Refills: 0 | Status: SHIPPED | OUTPATIENT
Start: 2019-10-24 | End: 2019-11-22

## 2019-11-22 ENCOUNTER — OFFICE VISIT (OUTPATIENT)
Dept: FAMILY MEDICINE CLINIC | Facility: CLINIC | Age: 54
End: 2019-11-22
Payer: COMMERCIAL

## 2019-11-22 VITALS
DIASTOLIC BLOOD PRESSURE: 80 MMHG | SYSTOLIC BLOOD PRESSURE: 122 MMHG | HEART RATE: 78 BPM | BODY MASS INDEX: 30.62 KG/M2 | WEIGHT: 202 LBS | TEMPERATURE: 98 F | RESPIRATION RATE: 16 BRPM | HEIGHT: 68 IN

## 2019-11-22 DIAGNOSIS — E66.9 OBESITY (BMI 30-39.9): Primary | ICD-10-CM

## 2019-11-22 DIAGNOSIS — J30.9 ALLERGIC RHINITIS, UNSPECIFIED SEASONALITY, UNSPECIFIED TRIGGER: ICD-10-CM

## 2019-11-22 DIAGNOSIS — M54.2 ACUTE NECK PAIN: ICD-10-CM

## 2019-11-22 PROCEDURE — 99214 OFFICE O/P EST MOD 30 MIN: CPT | Performed by: FAMILY MEDICINE

## 2019-11-22 RX ORDER — PHENTERMINE HYDROCHLORIDE 30 MG/1
30 CAPSULE ORAL EVERY MORNING
Qty: 30 CAPSULE | Refills: 0 | Status: SHIPPED | OUTPATIENT
Start: 2019-11-22 | End: 2019-12-20

## 2019-11-22 RX ORDER — CYCLOBENZAPRINE HCL 5 MG
5 TABLET ORAL 2 TIMES DAILY PRN
Qty: 60 TABLET | Refills: 0 | Status: SHIPPED | OUTPATIENT
Start: 2019-11-22 | End: 2020-02-03

## 2019-11-22 RX ORDER — MONTELUKAST SODIUM 10 MG/1
10 TABLET ORAL NIGHTLY
Qty: 30 TABLET | Refills: 0 | Status: SHIPPED | OUTPATIENT
Start: 2019-11-22 | End: 2020-01-31

## 2019-11-22 NOTE — PROGRESS NOTES
[facility] Visit Note  10/23/2019      Subjective:      Patient ID: Leona Carrel is a 47year old female. Chief Complaint:  Patient presents with:  Weight Check: Start of month 3 on Phentermine, down 1 lb.   Neck Pain: 1 month f/u, still atten nasal spray from Flonase to Nasonex but not much benefit. Also taking azelastine. OTC antihistamines cause drowsiness.  Never tried montelukast.           Review of Systems - as stated above in the HPI      Objective:      11/22/19  0706   BP: 122/80   Puls

## 2019-11-23 ENCOUNTER — HOSPITAL ENCOUNTER (OUTPATIENT)
Dept: GENERAL RADIOLOGY | Age: 54
Discharge: HOME | End: 2019-11-23
Attending: FAMILY MEDICINE
Payer: COMMERCIAL

## 2019-11-23 DIAGNOSIS — M54.2 ACUTE NECK PAIN: ICD-10-CM

## 2019-11-23 PROCEDURE — 72050 X-RAY EXAM NECK SPINE 4/5VWS: CPT | Performed by: FAMILY MEDICINE

## 2019-11-26 ENCOUNTER — PATIENT MESSAGE (OUTPATIENT)
Dept: FAMILY MEDICINE CLINIC | Facility: CLINIC | Age: 54
End: 2019-11-26

## 2019-11-26 RX ORDER — MONTELUKAST SODIUM 10 MG/1
TABLET ORAL
Qty: 90 TABLET | Refills: 0 | OUTPATIENT
Start: 2019-11-26

## 2019-11-27 ENCOUNTER — OFFICE VISIT (OUTPATIENT)
Dept: FAMILY MEDICINE CLINIC | Facility: CLINIC | Age: 54
End: 2019-11-27
Payer: COMMERCIAL

## 2019-11-27 VITALS
WEIGHT: 202 LBS | RESPIRATION RATE: 18 BRPM | HEART RATE: 96 BPM | HEIGHT: 68 IN | TEMPERATURE: 97 F | SYSTOLIC BLOOD PRESSURE: 130 MMHG | OXYGEN SATURATION: 99 % | DIASTOLIC BLOOD PRESSURE: 86 MMHG | BODY MASS INDEX: 30.62 KG/M2

## 2019-11-27 DIAGNOSIS — J01.00 ACUTE NON-RECURRENT MAXILLARY SINUSITIS: Primary | ICD-10-CM

## 2019-11-27 DIAGNOSIS — R05.9 COUGH: ICD-10-CM

## 2019-11-27 PROCEDURE — 99213 OFFICE O/P EST LOW 20 MIN: CPT | Performed by: NURSE PRACTITIONER

## 2019-11-27 RX ORDER — AMOXICILLIN AND CLAVULANATE POTASSIUM 875; 125 MG/1; MG/1
1 TABLET, FILM COATED ORAL 2 TIMES DAILY
Qty: 20 TABLET | Refills: 0 | Status: SHIPPED | OUTPATIENT
Start: 2019-11-27 | End: 2019-12-07

## 2019-11-27 RX ORDER — BENZONATATE 200 MG/1
200 CAPSULE ORAL 3 TIMES DAILY PRN
Qty: 20 CAPSULE | Refills: 0 | Status: SHIPPED | OUTPATIENT
Start: 2019-11-27 | End: 2019-12-04

## 2019-11-27 NOTE — TELEPHONE ENCOUNTER
----- Message from Lianet Cantu sent at 11/26/2019  6:37 PM CST -----  Regarding: Test Results Question  Contact: [phone]  I wondered when I should see the results of my neck X-ray, which was taken on Saturday, 11/23/19.

## 2019-11-27 NOTE — PROGRESS NOTES
CHIEF COMPLAINT:   Patient presents with:  Sinus Problem: congestion, cough, sore throat, sinus pressure ( 6 days)     HPI:   Wendy Barroso is a 47year old female who presents for sinus congestion for  6  days. Symptoms have been worsening since onset.  Si • BPPV (benign paroxysmal positional vertigo) 9/24/2019   • Deep vein thrombosis ([facility] [address] 1990    right leg, ankle injury & OCP   • Diastasis recti 11/17/2014   • Esophageal reflux 2014    Not noted by ENT in 2016   • Mixed hyperlipidemia 2017   • PONV (posto LUNGS: denies shortness of breath or wheezing, See HPI  CARDIOVASCULAR: denies chest pain or palpitations   GI: denies N/V/C or abdominal pain  NEURO: + sinus headaches. No numbness or tingling in face.     EXAM:   /86 (BP Location: Right arm)   Puls Requested Prescriptions     Signed Prescriptions Disp Refills   • Amoxicillin-Pot Clavulanate 875-125 MG Oral Tab 20 tablet 0     Sig: Take 1 tablet by mouth 2 (two) times daily for 10 days.    • benzonatate 200 MG Oral Cap 20 capsule 0     Sig: Take 1 caps · You can use an over-the-counter decongestant, unless a similar medicine was prescribed to you. Nasal sprays work the fastest. Use one that contains phenylephrine or oxymetazoline. First blow your nose gently. Then use the spray.  Do not use these medicine · Don’t have close contact with people who have sore throats, colds, or other upper respiratory infections. · Don’t smoke, and stay away from secondhand smoke. · Stay up to date with of your vaccines.   Date Last Reviewed: 11/1/2017  © 0753-5446 The [employer]

## 2019-12-03 DIAGNOSIS — M50.30 DDD (DEGENERATIVE DISC DISEASE), CERVICAL: Primary | ICD-10-CM

## 2019-12-03 DIAGNOSIS — M54.2 ACUTE NECK PAIN: ICD-10-CM

## 2019-12-09 ENCOUNTER — TELEPHONE (OUTPATIENT)
Dept: FAMILY MEDICINE CLINIC | Facility: CLINIC | Age: 54
End: 2019-12-09

## 2019-12-09 NOTE — TELEPHONE ENCOUNTER
Felicia Morales [facility] Clinical Staff             Hello,     Per [employer] this test did not meet medical necessity and has been denied.  Per their guidelines:     Advanced imaging may be indicated for neck or upper extremity pain when symptoms have been pr

## 2019-12-11 NOTE — TELEPHONE ENCOUNTER
Per my note, she has been hurting since early October so has at least now been hurting more than 6 wks. Please call pt for an update of her neck pain and document it.  If the pain is still significant/affecting her daily activities I would have her get the

## 2019-12-11 NOTE — TELEPHONE ENCOUNTER
Patient states back pain is a little better but still present. Pain is 3/10. Patient states she is able to perform her daily activities but pain does worsen with activity. Takes ibuprofen for pain as needed.  She is almost done with Physical therapy but it

## 2019-12-20 ENCOUNTER — OFFICE VISIT (OUTPATIENT)
Dept: FAMILY MEDICINE CLINIC | Facility: CLINIC | Age: 54
End: 2019-12-20
Payer: COMMERCIAL

## 2019-12-20 VITALS
DIASTOLIC BLOOD PRESSURE: 72 MMHG | RESPIRATION RATE: 16 BRPM | BODY MASS INDEX: 29.7 KG/M2 | HEART RATE: 80 BPM | SYSTOLIC BLOOD PRESSURE: 124 MMHG | TEMPERATURE: 98 F | HEIGHT: 68 IN | WEIGHT: 196 LBS

## 2019-12-20 DIAGNOSIS — G89.29 NECK PAIN, CHRONIC: ICD-10-CM

## 2019-12-20 DIAGNOSIS — M50.30 DDD (DEGENERATIVE DISC DISEASE), CERVICAL: Primary | ICD-10-CM

## 2019-12-20 DIAGNOSIS — E66.3 OVERWEIGHT (BMI 25.0-29.9): ICD-10-CM

## 2019-12-20 DIAGNOSIS — M54.2 NECK PAIN, CHRONIC: ICD-10-CM

## 2019-12-20 DIAGNOSIS — J01.00 SUBACUTE MAXILLARY SINUSITIS: ICD-10-CM

## 2019-12-20 PROCEDURE — 99214 OFFICE O/P EST MOD 30 MIN: CPT | Performed by: FAMILY MEDICINE

## 2019-12-20 RX ORDER — AZITHROMYCIN 250 MG/1
TABLET, FILM COATED ORAL
Qty: 6 TABLET | Refills: 0 | Status: SHIPPED | OUTPATIENT
Start: 2019-12-20 | End: 2020-01-31 | Stop reason: ALTCHOICE

## 2019-12-20 RX ORDER — PHENTERMINE HYDROCHLORIDE 30 MG/1
30 CAPSULE ORAL EVERY MORNING
Qty: 30 CAPSULE | Refills: 0 | Status: SHIPPED | OUTPATIENT
Start: 2019-12-20 | End: 2020-01-31

## 2019-12-24 ENCOUNTER — HOSPITAL ENCOUNTER (OUTPATIENT)
Dept: MRI IMAGING | Facility: HOSPITAL | Age: 54
Discharge: HOME | End: 2019-12-24
Attending: FAMILY MEDICINE
Payer: COMMERCIAL

## 2019-12-24 DIAGNOSIS — M50.30 DDD (DEGENERATIVE DISC DISEASE), CERVICAL: ICD-10-CM

## 2019-12-24 DIAGNOSIS — G89.29 NECK PAIN, CHRONIC: ICD-10-CM

## 2019-12-24 DIAGNOSIS — M54.2 NECK PAIN, CHRONIC: ICD-10-CM

## 2019-12-24 PROCEDURE — 72141 MRI NECK SPINE W/O DYE: CPT | Performed by: FAMILY MEDICINE

## 2020-01-31 ENCOUNTER — OFFICE VISIT (OUTPATIENT)
Dept: FAMILY MEDICINE CLINIC | Facility: CLINIC | Age: 55
End: 2020-01-31
Payer: COMMERCIAL

## 2020-01-31 VITALS
RESPIRATION RATE: 16 BRPM | HEART RATE: 64 BPM | HEIGHT: 68 IN | WEIGHT: 198 LBS | BODY MASS INDEX: 30.01 KG/M2 | SYSTOLIC BLOOD PRESSURE: 122 MMHG | DIASTOLIC BLOOD PRESSURE: 78 MMHG | TEMPERATURE: 98 F

## 2020-01-31 DIAGNOSIS — M47.812 FACET ARTHROPATHY, CERVICAL: ICD-10-CM

## 2020-01-31 DIAGNOSIS — M47.812 FACET ARTHROPATHY, CERVICAL: Primary | ICD-10-CM

## 2020-01-31 DIAGNOSIS — E66.3 OVERWEIGHT (BMI 25.0-29.9): ICD-10-CM

## 2020-01-31 PROCEDURE — 99213 OFFICE O/P EST LOW 20 MIN: CPT | Performed by: FAMILY MEDICINE

## 2020-01-31 RX ORDER — MELOXICAM 15 MG/1
TABLET ORAL
Qty: 90 TABLET | Refills: 0 | Status: SHIPPED | OUTPATIENT
Start: 2020-01-31 | End: 2020-02-20

## 2020-01-31 RX ORDER — PHENTERMINE HYDROCHLORIDE 30 MG/1
30 CAPSULE ORAL EVERY MORNING
Qty: 30 CAPSULE | Refills: 0 | Status: SHIPPED | OUTPATIENT
Start: 2020-01-31 | End: 2020-02-20

## 2020-01-31 RX ORDER — MELOXICAM 15 MG/1
15 TABLET ORAL DAILY PRN
Qty: 30 TABLET | Refills: 0 | Status: SHIPPED | OUTPATIENT
Start: 2020-01-31 | End: 2020-01-31

## 2020-01-31 NOTE — TELEPHONE ENCOUNTER
Requested Prescriptions     Pending Prescriptions Disp Refills   • MELOXICAM 15 MG Oral Tab [Pharmacy Med Name: MELOXICAM 15MG TABLETS] 90 tablet 0     Sig: TAKE 1 TABLET(15 MG) BY MOUTH DAILY WITH FOOD AS NEEDED FOR PAIN       LOV: 1/31/20  RTC: 1 month

## 2020-01-31 NOTE — PROGRESS NOTES
[facility] Visit Note  12/20/2019      Subjective:      Patient ID: Raisa Meek is a 47year old female. Chief Complaint:  Patient presents with:  Weight Check: 1 month f/u on life style changes, gained 2 lbs. Med refill requested.   Neck Jude Chapa HPI      Objective:      01/31/20  0722   BP: 122/78   Pulse: 64   Resp: 16   Temp: 97.8 °F (36.6 °C)   TempSrc: Temporal   Weight: 198 lb (89.8 kg)   Height: 68\"       Physical Examination   General:  Alert, in no acute distress  HEENT: NCAT, EOMI, mucus

## 2020-02-03 PROBLEM — M47.812 FACET ARTHROPATHY, CERVICAL: Status: ACTIVE | Noted: 2020-02-03

## 2020-02-07 ENCOUNTER — OFFICE VISIT (OUTPATIENT)
Dept: PHYSICAL MEDICINE AND REHAB | Facility: CLINIC | Age: 55
End: 2020-02-07
Payer: COMMERCIAL

## 2020-02-07 VITALS
SYSTOLIC BLOOD PRESSURE: 119 MMHG | HEIGHT: 68 IN | BODY MASS INDEX: 29.55 KG/M2 | HEART RATE: 74 BPM | DIASTOLIC BLOOD PRESSURE: 78 MMHG | WEIGHT: 195 LBS | OXYGEN SATURATION: 97 %

## 2020-02-07 DIAGNOSIS — M47.812 FACET ARTHROPATHY, CERVICAL: Primary | ICD-10-CM

## 2020-02-07 PROCEDURE — 99204 OFFICE O/P NEW MOD 45 MIN: CPT | Performed by: PHYSICAL MEDICINE & REHABILITATION

## 2020-02-07 NOTE — PATIENT INSTRUCTIONS
Take the meloxicam 15mg once daily every day for the next 11 days, and then you may take the medication once daily as needed. Refill policies:    • Allow 2-3 business days for refills; controlled substances may take longer.   • Contact your pharmacy at MRI or CT scans, do not schedule the test until this office has notified you that the test has been approved by your insurer. Depending on your insurance carrier, approval may take 3-10 days.  It is highly recommended patients contact their insurance jj form completion.

## 2020-02-07 NOTE — PROGRESS NOTES
PHYSICAL EXAM:   Physical Exam   Constitutional:           Patient presents in office today with reported pain in right side of the neck. When bending neck pain can also be on the left. Patient denies radiating pain, numbness or tingling.     Current pain

## 2020-02-07 NOTE — H&P
[address] [place] [address]    History and Physical    Anthony Jimenez Patient Status:  No patient class for patient encounter    1965 MRN [id number]   Location [address] [place] [address], [address] [place] [address] Attending No att.  p noted by ENT in 2016   • Facet arthropathy, cervical 2/3/2020   • Mixed hyperlipidemia 2017   • PONV (postoperative nausea and vomiting)    • Thyroid nodule     sees Dr. Leonarda Hamilton   • TMJ (temporomandibular joint syndrome)    • Visual impairment     glasses shortness of breath. Cardiovascular: Negative for chest pain and palpitations. Gastrointestinal: Negative for diarrhea and constipation. Genitourinary: Negative for bladder incontinence. Musculoskeletal: Positive for neck pain and neck stiffness. BUN 11 09/21/2019     09/21/2019    K 4.6 09/21/2019     09/21/2019    CO2 27.0 09/21/2019    GLU 88 09/21/2019    CA 8.6 09/21/2019    ALB 3.7 09/21/2019    ALKPHO 58 09/21/2019    BILT 0.6 09/21/2019    TP 7.4 09/21/2019    AST 15 09/21/2019

## 2020-02-20 ENCOUNTER — OFFICE VISIT (OUTPATIENT)
Dept: INTERNAL MEDICINE CLINIC | Facility: CLINIC | Age: 55
End: 2020-02-20

## 2020-02-20 ENCOUNTER — TELEPHONE (OUTPATIENT)
Dept: INTERNAL MEDICINE CLINIC | Facility: CLINIC | Age: 55
End: 2020-02-20

## 2020-02-20 VITALS
BODY MASS INDEX: 30.92 KG/M2 | SYSTOLIC BLOOD PRESSURE: 110 MMHG | DIASTOLIC BLOOD PRESSURE: 62 MMHG | RESPIRATION RATE: 16 BRPM | HEART RATE: 62 BPM | WEIGHT: 204 LBS | HEIGHT: 68 IN

## 2020-02-20 DIAGNOSIS — E06.3 HYPOTHYROIDISM DUE TO HASHIMOTO'S THYROIDITIS: ICD-10-CM

## 2020-02-20 DIAGNOSIS — E66.9 CLASS 1 OBESITY WITHOUT SERIOUS COMORBIDITY WITH BODY MASS INDEX (BMI) OF 31.0 TO 31.9 IN ADULT, UNSPECIFIED OBESITY TYPE: ICD-10-CM

## 2020-02-20 DIAGNOSIS — E03.8 HYPOTHYROIDISM DUE TO HASHIMOTO'S THYROIDITIS: ICD-10-CM

## 2020-02-20 DIAGNOSIS — Z51.81 THERAPEUTIC DRUG MONITORING: Primary | ICD-10-CM

## 2020-02-20 DIAGNOSIS — E78.2 MIXED HYPERLIPIDEMIA: ICD-10-CM

## 2020-02-20 DIAGNOSIS — E04.2 MULTIPLE THYROID NODULES: ICD-10-CM

## 2020-02-20 PROCEDURE — 99214 OFFICE O/P EST MOD 30 MIN: CPT | Performed by: NURSE PRACTITIONER

## 2020-02-20 RX ORDER — TOPIRAMATE 25 MG/1
25 TABLET ORAL 2 TIMES DAILY
Qty: 60 TABLET | Refills: 1 | Status: SHIPPED | OUTPATIENT
Start: 2020-02-20 | End: 2020-02-26

## 2020-02-20 RX ORDER — PHENTERMINE HYDROCHLORIDE 37.5 MG/1
37.5 TABLET ORAL
Qty: 30 TABLET | Refills: 0 | Status: SHIPPED | OUTPATIENT
Start: 2020-02-20 | End: 2020-03-31

## 2020-02-20 NOTE — PATIENT INSTRUCTIONS
We are here to support you with weight loss, but please remember that you still need your primary care provider for your routine health maintenance.       PLAN:  Start phentermine 37.5mg and Topamax: take 1 tablet before dinner for the first week (to decrea With My Fitness Pal-->When you set-up the dane or need to adjust settings:                Goals should include:                  Lose 1.5-2 lbs per week                Activity level: not very active (can't count exercise towards calorie number

## 2020-02-22 ENCOUNTER — LAB ENCOUNTER (OUTPATIENT)
Dept: LAB | Age: 55
End: 2020-02-22
Attending: NURSE PRACTITIONER
Payer: COMMERCIAL

## 2020-02-22 DIAGNOSIS — Z51.81 THERAPEUTIC DRUG MONITORING: ICD-10-CM

## 2020-02-22 DIAGNOSIS — E66.9 CLASS 1 OBESITY WITHOUT SERIOUS COMORBIDITY WITH BODY MASS INDEX (BMI) OF 31.0 TO 31.9 IN ADULT, UNSPECIFIED OBESITY TYPE: ICD-10-CM

## 2020-02-22 LAB
EST. AVERAGE GLUCOSE BLD GHB EST-MCNC: 105 MG/DL (ref 68–126)
HBA1C MFR BLD HPLC: 5.3 % (ref ?–5.7)
VIT B12 SERPL-MCNC: 365 PG/ML (ref 193–986)
VIT D+METAB SERPL-MCNC: 23.8 NG/ML (ref 30–100)

## 2020-02-22 PROCEDURE — 82306 VITAMIN D 25 HYDROXY: CPT | Performed by: NURSE PRACTITIONER

## 2020-02-22 PROCEDURE — 36415 COLL VENOUS BLD VENIPUNCTURE: CPT | Performed by: NURSE PRACTITIONER

## 2020-02-22 PROCEDURE — 83036 HEMOGLOBIN GLYCOSYLATED A1C: CPT | Performed by: NURSE PRACTITIONER

## 2020-02-22 PROCEDURE — 82607 VITAMIN B-12: CPT | Performed by: NURSE PRACTITIONER

## 2020-02-23 NOTE — PROGRESS NOTES
a1c 5.3%= normal  Low Vitamin b level, would recommend vit b injections monthly X 6 months or nascobal   Low vitamin D level, please start taking ergocalciferol 50,000 U q week 12 weeks (please order).  Then start daily maintenance vitamin D 2000 Units

## 2020-02-24 ENCOUNTER — PATIENT MESSAGE (OUTPATIENT)
Dept: INTERNAL MEDICINE CLINIC | Facility: CLINIC | Age: 55
End: 2020-02-24

## 2020-02-24 NOTE — TELEPHONE ENCOUNTER
From: Lelon Curling  To: ANNE MARIE Monte  Sent: 2/24/2020 2:23 PM CST  Subject: Visit Follow-up Question    I only received the phentermine (?sp?) prescription not the other one that affects the taste of sweets.

## 2020-02-26 RX ORDER — TOPIRAMATE 25 MG/1
25 TABLET ORAL 2 TIMES DAILY
Qty: 60 TABLET | Refills: 1 | Status: SHIPPED | OUTPATIENT
Start: 2020-02-26 | End: 2020-04-28

## 2020-03-02 ENCOUNTER — OFFICE VISIT (OUTPATIENT)
Dept: PHYSICAL THERAPY | Age: 55
End: 2020-03-02
Attending: PHYSICAL MEDICINE & REHABILITATION
Payer: COMMERCIAL

## 2020-03-02 DIAGNOSIS — M47.812 FACET ARTHROPATHY, CERVICAL: ICD-10-CM

## 2020-03-02 PROCEDURE — 97161 PT EVAL LOW COMPLEX 20 MIN: CPT

## 2020-03-02 PROCEDURE — 97110 THERAPEUTIC EXERCISES: CPT

## 2020-03-02 NOTE — PROGRESS NOTES
SPINE EVALUATION:   Referring Physician: Dr. Shimon Arguello  Diagnosis: Cervical Facet Impingement - Right C7T1     Date of Service: 3/2/2020     PATIENT SUMMARY   [facility] [place] is a 47year old y/o female who presents to therapy today with complaints of right Neuro Screen: The patient was assessed for MMT, sensation, and reflexes per all myotomes and dermatomes and presents with symmetrical findings right vs left.       Cervical AROM:   Flexion: 50% normal motion   Extension: 50% normal motion  Sidebending: R her . 5. Patient will demonstrate an increase in MLT of upper trapezius and pec major in order to return to more proper postural awareness.       Frequency / Duration: Patient will be seen for 2 x/week or a total of 12 visits over a [address] [place], [place] [id number]

## 2020-03-04 ENCOUNTER — TELEPHONE (OUTPATIENT)
Dept: INTERNAL MEDICINE CLINIC | Facility: CLINIC | Age: 55
End: 2020-03-04

## 2020-03-04 ENCOUNTER — OFFICE VISIT (OUTPATIENT)
Dept: PHYSICAL THERAPY | Age: 55
End: 2020-03-04
Attending: PHYSICAL MEDICINE & REHABILITATION
Payer: COMMERCIAL

## 2020-03-04 PROCEDURE — 97110 THERAPEUTIC EXERCISES: CPT

## 2020-03-04 PROCEDURE — 97140 MANUAL THERAPY 1/> REGIONS: CPT

## 2020-03-04 NOTE — PROGRESS NOTES
Dx: Cervical Facet Impingement         Authorized # of Visits:  12 (PPO)         Next MD visit: none scheduled  Fall Risk: standard         Precautions: n/a             Subjective: States no adverse effects from last session    Objective: Treatment initiat

## 2020-03-09 ENCOUNTER — APPOINTMENT (OUTPATIENT)
Dept: PHYSICAL THERAPY | Age: 55
End: 2020-03-09
Attending: PHYSICAL MEDICINE & REHABILITATION
Payer: COMMERCIAL

## 2020-03-10 RX ORDER — ERGOCALCIFEROL 1.25 MG/1
50000 CAPSULE ORAL WEEKLY
Qty: 23 CAPSULE | Refills: 0 | Status: SHIPPED | OUTPATIENT
Start: 2020-03-10 | End: 2020-07-22

## 2020-03-10 NOTE — TELEPHONE ENCOUNTER
Patient discussed her lab results. She would like to try and get nascobal - if not covered, she will switch to shots. I am to let her know. Discussed Vitamin D use and she will begin. Both medicines sent to her verified pharmacy.

## 2020-03-11 ENCOUNTER — APPOINTMENT (OUTPATIENT)
Dept: PHYSICAL THERAPY | Age: 55
End: 2020-03-11
Attending: PHYSICAL MEDICINE & REHABILITATION
Payer: COMMERCIAL

## 2020-03-14 ENCOUNTER — HOSPITAL ENCOUNTER (OUTPATIENT)
Dept: MAMMOGRAPHY | Age: 55
Discharge: HOME | End: 2020-03-14
Attending: FAMILY MEDICINE
Payer: COMMERCIAL

## 2020-03-14 DIAGNOSIS — Z12.39 BREAST CANCER SCREENING: ICD-10-CM

## 2020-03-14 PROCEDURE — 77063 BREAST TOMOSYNTHESIS BI: CPT | Performed by: FAMILY MEDICINE

## 2020-03-14 PROCEDURE — 77067 SCR MAMMO BI INCL CAD: CPT | Performed by: FAMILY MEDICINE

## 2020-03-16 ENCOUNTER — APPOINTMENT (OUTPATIENT)
Dept: PHYSICAL THERAPY | Age: 55
End: 2020-03-16
Attending: PHYSICAL MEDICINE & REHABILITATION
Payer: COMMERCIAL

## 2020-03-18 ENCOUNTER — APPOINTMENT (OUTPATIENT)
Dept: PHYSICAL THERAPY | Age: 55
End: 2020-03-18
Attending: PHYSICAL MEDICINE & REHABILITATION
Payer: COMMERCIAL

## 2020-03-23 ENCOUNTER — APPOINTMENT (OUTPATIENT)
Dept: PHYSICAL THERAPY | Age: 55
End: 2020-03-23
Attending: PHYSICAL MEDICINE & REHABILITATION
Payer: COMMERCIAL

## 2020-03-25 ENCOUNTER — PATIENT MESSAGE (OUTPATIENT)
Dept: INTERNAL MEDICINE CLINIC | Facility: CLINIC | Age: 55
End: 2020-03-25

## 2020-03-25 ENCOUNTER — APPOINTMENT (OUTPATIENT)
Dept: PHYSICAL THERAPY | Age: 55
End: 2020-03-25
Attending: PHYSICAL MEDICINE & REHABILITATION
Payer: COMMERCIAL

## 2020-03-25 NOTE — TELEPHONE ENCOUNTER
The appointment scheduled for 3/25/20 says cancelled. Will forward to [facility].

## 2020-03-25 NOTE — TELEPHONE ENCOUNTER
From: Raisa Meek  To: ANNE MARIE Villalobos  Sent: 3/25/2020 9:04 AM CDT  Subject: Other    Trying to understand if I still have a teleconference appointment scheduled for today or ? I thought it was for 8:40 +\- some amount.  I am out of phentermine a

## 2020-03-26 ENCOUNTER — TELEPHONE (OUTPATIENT)
Dept: INTERNAL MEDICINE CLINIC | Facility: CLINIC | Age: 55
End: 2020-03-26

## 2020-03-26 NOTE — TELEPHONE ENCOUNTER
Called patient to see if she would be avail now to do evisit and she didn't  Answer, left message. Never had her name on my excel spreadsheet this AM. You can peggy with her for tomorrow (around 1:00) please.

## 2020-03-27 ENCOUNTER — TELEPHONE (OUTPATIENT)
Dept: INTERNAL MEDICINE CLINIC | Facility: CLINIC | Age: 55
End: 2020-03-27

## 2020-03-30 ENCOUNTER — APPOINTMENT (OUTPATIENT)
Dept: PHYSICAL THERAPY | Age: 55
End: 2020-03-30
Attending: PHYSICAL MEDICINE & REHABILITATION
Payer: COMMERCIAL

## 2020-03-31 ENCOUNTER — VIRTUAL PHONE E/M (OUTPATIENT)
Dept: INTERNAL MEDICINE CLINIC | Facility: CLINIC | Age: 55
End: 2020-03-31
Payer: COMMERCIAL

## 2020-03-31 DIAGNOSIS — E03.8 HYPOTHYROIDISM DUE TO HASHIMOTO'S THYROIDITIS: ICD-10-CM

## 2020-03-31 DIAGNOSIS — E53.8 VITAMIN B12 DEFICIENCY: ICD-10-CM

## 2020-03-31 DIAGNOSIS — E66.9 CLASS 1 OBESITY WITHOUT SERIOUS COMORBIDITY WITH BODY MASS INDEX (BMI) OF 31.0 TO 31.9 IN ADULT, UNSPECIFIED OBESITY TYPE: ICD-10-CM

## 2020-03-31 DIAGNOSIS — E55.9 VITAMIN D DEFICIENCY: ICD-10-CM

## 2020-03-31 DIAGNOSIS — E06.3 HYPOTHYROIDISM DUE TO HASHIMOTO'S THYROIDITIS: ICD-10-CM

## 2020-03-31 DIAGNOSIS — Z51.81 THERAPEUTIC DRUG MONITORING: Primary | ICD-10-CM

## 2020-03-31 DIAGNOSIS — E78.2 MIXED HYPERLIPIDEMIA: ICD-10-CM

## 2020-03-31 PROCEDURE — 99213 OFFICE O/P EST LOW 20 MIN: CPT | Performed by: NURSE PRACTITIONER

## 2020-03-31 RX ORDER — PHENTERMINE HYDROCHLORIDE 37.5 MG/1
37.5 TABLET ORAL
Qty: 30 TABLET | Refills: 0 | Status: SHIPPED | OUTPATIENT
Start: 2020-03-31 | End: 2020-04-28

## 2020-03-31 NOTE — PATIENT INSTRUCTIONS
We are here to support you with weight loss, but please remember that you still need your primary care provider for your routine health maintenance.       PLAN:  Continue with phentermine 37.5mg and topamax 25 mg bid  Follow up with me in 1 month  Schedule ** Daily INPUT> Look at nutrition section-- \"nutrients\" and it will break down your macros for the day (ie. Protein, carbs, fibers, sugars and fats). Try to stay within these numbers daily     2.  \"7 minute workout\" to help with exercise/a

## 2020-04-01 ENCOUNTER — APPOINTMENT (OUTPATIENT)
Dept: PHYSICAL THERAPY | Age: 55
End: 2020-04-01
Attending: PHYSICAL MEDICINE & REHABILITATION
Payer: COMMERCIAL

## 2020-04-06 ENCOUNTER — APPOINTMENT (OUTPATIENT)
Dept: PHYSICAL THERAPY | Age: 55
End: 2020-04-06
Attending: PHYSICAL MEDICINE & REHABILITATION
Payer: COMMERCIAL

## 2020-04-13 ENCOUNTER — APPOINTMENT (OUTPATIENT)
Dept: PHYSICAL THERAPY | Age: 55
End: 2020-04-13
Attending: PHYSICAL MEDICINE & REHABILITATION
Payer: COMMERCIAL

## 2020-04-15 ENCOUNTER — TELEPHONE (OUTPATIENT)
Dept: INTERNAL MEDICINE CLINIC | Facility: CLINIC | Age: 55
End: 2020-04-15

## 2020-04-15 ENCOUNTER — APPOINTMENT (OUTPATIENT)
Dept: PHYSICAL THERAPY | Age: 55
End: 2020-04-15
Attending: PHYSICAL MEDICINE & REHABILITATION
Payer: COMMERCIAL

## 2020-04-16 NOTE — TELEPHONE ENCOUNTER
4/15/20 @ 11:34am Spoke to Aj Reilly at [facility], #[phone], Ref# [id number].  They verified that patient has following benefits for below services:   Verified that patient DOES have TeleHealth Benefits under plan BUT Marcos Hdz is not listed as a provider w

## 2020-04-20 ENCOUNTER — APPOINTMENT (OUTPATIENT)
Dept: PHYSICAL THERAPY | Age: 55
End: 2020-04-20
Attending: PHYSICAL MEDICINE & REHABILITATION
Payer: COMMERCIAL

## 2020-04-22 ENCOUNTER — APPOINTMENT (OUTPATIENT)
Dept: PHYSICAL THERAPY | Age: 55
End: 2020-04-22
Attending: PHYSICAL MEDICINE & REHABILITATION
Payer: COMMERCIAL

## 2020-04-22 ENCOUNTER — TELEPHONE (OUTPATIENT)
Dept: INTERNAL MEDICINE CLINIC | Facility: CLINIC | Age: 55
End: 2020-04-22

## 2020-04-22 NOTE — TELEPHONE ENCOUNTER
Called and left detailed message for patient that per insurance carrier, she does have Dietitian benefits but Genevieve Diaz RD is not a provider who is able to render services under patient's plan.  Service must be rendered by an MD. Encouraged patient to bjorn

## 2020-04-28 ENCOUNTER — VIRTUAL PHONE E/M (OUTPATIENT)
Dept: INTERNAL MEDICINE CLINIC | Facility: CLINIC | Age: 55
End: 2020-04-28
Payer: COMMERCIAL

## 2020-04-28 ENCOUNTER — TELEPHONE (OUTPATIENT)
Dept: INTERNAL MEDICINE CLINIC | Facility: CLINIC | Age: 55
End: 2020-04-28

## 2020-04-28 VITALS — BODY MASS INDEX: 29 KG/M2 | WEIGHT: 189 LBS

## 2020-04-28 DIAGNOSIS — E78.2 MIXED HYPERLIPIDEMIA: ICD-10-CM

## 2020-04-28 DIAGNOSIS — E06.3 HYPOTHYROIDISM DUE TO HASHIMOTO'S THYROIDITIS: ICD-10-CM

## 2020-04-28 DIAGNOSIS — E53.8 VITAMIN B12 DEFICIENCY: ICD-10-CM

## 2020-04-28 DIAGNOSIS — E66.9 CLASS 1 OBESITY WITHOUT SERIOUS COMORBIDITY WITH BODY MASS INDEX (BMI) OF 31.0 TO 31.9 IN ADULT, UNSPECIFIED OBESITY TYPE: ICD-10-CM

## 2020-04-28 DIAGNOSIS — E55.9 VITAMIN D DEFICIENCY: ICD-10-CM

## 2020-04-28 DIAGNOSIS — E03.8 HYPOTHYROIDISM DUE TO HASHIMOTO'S THYROIDITIS: ICD-10-CM

## 2020-04-28 DIAGNOSIS — Z51.81 THERAPEUTIC DRUG MONITORING: Primary | ICD-10-CM

## 2020-04-28 PROCEDURE — 99214 OFFICE O/P EST MOD 30 MIN: CPT | Performed by: NURSE PRACTITIONER

## 2020-04-28 RX ORDER — TOPIRAMATE 25 MG/1
25 TABLET ORAL 2 TIMES DAILY
Qty: 180 TABLET | Refills: 0 | Status: SHIPPED | OUTPATIENT
Start: 2020-04-28 | End: 2020-07-22

## 2020-04-28 RX ORDER — PHENTERMINE HYDROCHLORIDE 37.5 MG/1
37.5 TABLET ORAL
Qty: 30 TABLET | Refills: 1 | Status: SHIPPED | OUTPATIENT
Start: 2020-04-29 | End: 2020-06-29

## 2020-04-28 NOTE — PATIENT INSTRUCTIONS
We are here to support you with weight loss, but please remember that you still need your primary care provider for your routine health maintenance.       PLAN:  Continue with phentermine 37.5mg and topamax 25mg   Follow up with me in 6 weeks  Schedule foll ** Daily INPUT> Look at nutrition section-- \"nutrients\" and it will break down your macros for the day (ie. Protein, carbs, fibers, sugars and fats). Try to stay within these numbers daily     2.  \"7 minute workout\" to help with exercise/a

## 2020-04-28 NOTE — PROGRESS NOTES
Virtual Telephone Check-In    Cheryl Gipson verbally consents to a Virtual/Telephone Check-In visit on 4/28/2020. Patient understands and accepts financial responsibility for any deductible, co-insurance and/or co-pays associated with this service.     D increased work of breathing    Assessment/Plan:   Diagnoses and all orders for this visit:    Therapeutic drug monitoring  Class 1 obesity without serious comorbidity with body mass index (BMI) of 31.0 to 31.9 in adult, unspecified obesity type  See below

## 2020-04-28 NOTE — TELEPHONE ENCOUNTER
Other      Payer:  [employer] - [employer]   Unable to find a match for the first or last name of the prescriber you submitted, please resubmit the request and check that the prescriber's entire first and last names are accurate.    View History   Medication

## 2020-04-29 NOTE — TELEPHONE ENCOUNTER
I called [employer] to initiate prior auth - they do not handle. Must speak with [employer].   Transferred  And spoke with rep who said patient is already approved and has pa approval on file:      Approved 8/20/20  PA [id number]    This pa notice was initiated by Cici

## 2020-06-05 ENCOUNTER — VIRTUAL PHONE E/M (OUTPATIENT)
Dept: INTERNAL MEDICINE CLINIC | Facility: CLINIC | Age: 55
End: 2020-06-05

## 2020-06-05 DIAGNOSIS — E03.8 HYPOTHYROIDISM DUE TO HASHIMOTO'S THYROIDITIS: ICD-10-CM

## 2020-06-05 DIAGNOSIS — E78.2 MIXED HYPERLIPIDEMIA: ICD-10-CM

## 2020-06-05 DIAGNOSIS — Z51.81 THERAPEUTIC DRUG MONITORING: Primary | ICD-10-CM

## 2020-06-05 DIAGNOSIS — E06.3 HYPOTHYROIDISM DUE TO HASHIMOTO'S THYROIDITIS: ICD-10-CM

## 2020-06-05 DIAGNOSIS — E55.9 VITAMIN D DEFICIENCY: ICD-10-CM

## 2020-06-05 DIAGNOSIS — E53.8 VITAMIN B12 DEFICIENCY: ICD-10-CM

## 2020-06-05 DIAGNOSIS — E66.9 CLASS 1 OBESITY WITHOUT SERIOUS COMORBIDITY WITH BODY MASS INDEX (BMI) OF 31.0 TO 31.9 IN ADULT, UNSPECIFIED OBESITY TYPE: ICD-10-CM

## 2020-06-05 PROCEDURE — 99213 OFFICE O/P EST LOW 20 MIN: CPT | Performed by: NURSE PRACTITIONER

## 2020-06-05 NOTE — PROGRESS NOTES
Virtual Telephone Check-In    Tex Hashimoto verbally consents to a Virtual/Telephone Check-In visit on 6/5/2020. Patient understands and accepts financial responsibility for any deductible, co-insurance and/or co-pays associated with this service.     Leonardo sentences, no increased work of breathing    Assessment/Plan:   Diagnoses and all orders for this visit:    Therapeutic drug monitoring  Class 1 obesity without serious comorbidity with body mass index (BMI) of 31.0 to 31.9 in adult, unspecified obesity ty

## 2020-06-05 NOTE — PATIENT INSTRUCTIONS
We are here to support you with weight loss, but please remember that you still need your primary care provider for your routine health maintenance.       PLAN:  Will continue with phentermine 37.5mg and topamax  Follow up with me in 1 month  Schedule oneil ** Daily INPUT> Look at nutrition section-- \"nutrients\" and it will break down your macros for the day (ie. Protein, carbs, fibers, sugars and fats). Try to stay within these numbers daily     2.  \"7 minute workout\" to help with exercise/a

## 2020-06-09 ENCOUNTER — APPOINTMENT (OUTPATIENT)
Dept: CT IMAGING | Age: 55
End: 2020-06-09
Attending: EMERGENCY MEDICINE
Payer: COMMERCIAL

## 2020-06-09 ENCOUNTER — HOSPITAL ENCOUNTER (EMERGENCY)
Age: 55
Discharge: HOME | End: 2020-06-10
Attending: EMERGENCY MEDICINE
Payer: COMMERCIAL

## 2020-06-09 DIAGNOSIS — R09.1 PLEURISY: Primary | ICD-10-CM

## 2020-06-09 PROCEDURE — 84484 ASSAY OF TROPONIN QUANT: CPT | Performed by: EMERGENCY MEDICINE

## 2020-06-09 PROCEDURE — 99284 EMERGENCY DEPT VISIT MOD MDM: CPT

## 2020-06-09 PROCEDURE — 85379 FIBRIN DEGRADATION QUANT: CPT | Performed by: EMERGENCY MEDICINE

## 2020-06-09 PROCEDURE — 80053 COMPREHEN METABOLIC PANEL: CPT | Performed by: EMERGENCY MEDICINE

## 2020-06-09 PROCEDURE — 85025 COMPLETE CBC W/AUTO DIFF WBC: CPT | Performed by: EMERGENCY MEDICINE

## 2020-06-09 PROCEDURE — 93010 ELECTROCARDIOGRAM REPORT: CPT

## 2020-06-09 PROCEDURE — 99285 EMERGENCY DEPT VISIT HI MDM: CPT

## 2020-06-09 PROCEDURE — 93005 ELECTROCARDIOGRAM TRACING: CPT

## 2020-06-09 PROCEDURE — 36415 COLL VENOUS BLD VENIPUNCTURE: CPT

## 2020-06-09 PROCEDURE — 71260 CT THORAX DX C+: CPT | Performed by: EMERGENCY MEDICINE

## 2020-06-10 VITALS
OXYGEN SATURATION: 98 % | TEMPERATURE: 97 F | HEART RATE: 72 BPM | BODY MASS INDEX: 28.04 KG/M2 | RESPIRATION RATE: 18 BRPM | DIASTOLIC BLOOD PRESSURE: 70 MMHG | SYSTOLIC BLOOD PRESSURE: 132 MMHG | WEIGHT: 185 LBS | HEIGHT: 68 IN

## 2020-06-10 NOTE — ED PROVIDER NOTES
Patient Seen in: THE [facility] Emergency Department In [place]      History   Patient presents with:  Chest Pain Angina    Stated Complaint: chest pain    HPI    Patient states that about noon yesterday she noted onset of dull pain across the anterior chest. rooted, septum deviation correction\")                    Social History    Tobacco Use      Smoking status: Never Smoker      Smokeless tobacco: Never Used    Alcohol use:  Yes      Alcohol/week: 1.0 standard drinks      Types: 1 Glasses of wine per week normal limits   TROPONIN I - Normal   CBC WITH DIFFERENTIAL WITH PLATELET    Narrative: The following orders were created for panel order CBC WITH DIFFERENTIAL WITH PLATELET.   Procedure                               Abnormality         Status

## 2020-06-10 NOTE — ED INITIAL ASSESSMENT (HPI)
46 y/o female to ED with c/o of chest pain. Patient reports pain started today roughly around 1200, pain is constant with intermittent sharp. Worse when taking a deep breath.

## 2020-06-29 ENCOUNTER — VIRTUAL PHONE E/M (OUTPATIENT)
Dept: INTERNAL MEDICINE CLINIC | Facility: CLINIC | Age: 55
End: 2020-06-29

## 2020-06-29 VITALS — WEIGHT: 180 LBS | BODY MASS INDEX: 27 KG/M2

## 2020-06-29 DIAGNOSIS — E78.2 MIXED HYPERLIPIDEMIA: ICD-10-CM

## 2020-06-29 DIAGNOSIS — E03.8 HYPOTHYROIDISM DUE TO HASHIMOTO'S THYROIDITIS: ICD-10-CM

## 2020-06-29 DIAGNOSIS — E06.3 HYPOTHYROIDISM DUE TO HASHIMOTO'S THYROIDITIS: ICD-10-CM

## 2020-06-29 DIAGNOSIS — Z51.81 THERAPEUTIC DRUG MONITORING: Primary | ICD-10-CM

## 2020-06-29 DIAGNOSIS — E55.9 VITAMIN D DEFICIENCY: ICD-10-CM

## 2020-06-29 DIAGNOSIS — E53.8 VITAMIN B12 DEFICIENCY: ICD-10-CM

## 2020-06-29 PROCEDURE — 99213 OFFICE O/P EST LOW 20 MIN: CPT | Performed by: NURSE PRACTITIONER

## 2020-06-29 RX ORDER — PHENTERMINE HYDROCHLORIDE 37.5 MG/1
37.5 TABLET ORAL
Qty: 30 TABLET | Refills: 0 | Status: SHIPPED | OUTPATIENT
Start: 2020-06-29 | End: 2020-07-22

## 2020-06-29 NOTE — PROGRESS NOTES
Virtual Telephone Check-In    Preet Dyer verbally consents to a Virtual/Telephone Check-In visit on 6/29/2020. Patient understands and accepts financial responsibility for any deductible, co-insurance and/or co-pays associated with this service.     D and thought process   Patient speaking in full sentences, no increased work of breathing    Assessment/Plan:   Diagnoses and all orders for this visit:    Therapeutic drug monitoring  Class 1 obesity without serious comorbidity with body mass index (BMI) o Feliz Munoz, APRN

## 2020-06-29 NOTE — PATIENT INSTRUCTIONS
We are here to support you with weight loss, but please remember that you still need your primary care provider for your routine health maintenance.       PLAN:  Will continue with phentermine 37.5mg and topamax 25mg bid   Follow up with me in 1 month  Sche ** Daily INPUT> Look at nutrition section-- \"nutrients\" and it will break down your macros for the day (ie. Protein, carbs, fibers, sugars and fats). Try to stay within these numbers daily     2.  \"7 minute workout\" to help with exercise/a

## 2020-07-22 ENCOUNTER — OFFICE VISIT (OUTPATIENT)
Dept: INTERNAL MEDICINE CLINIC | Facility: CLINIC | Age: 55
End: 2020-07-22
Payer: COMMERCIAL

## 2020-07-22 VITALS
BODY MASS INDEX: 27.43 KG/M2 | WEIGHT: 181 LBS | DIASTOLIC BLOOD PRESSURE: 72 MMHG | HEART RATE: 74 BPM | RESPIRATION RATE: 16 BRPM | HEIGHT: 68 IN | SYSTOLIC BLOOD PRESSURE: 122 MMHG | TEMPERATURE: 98 F

## 2020-07-22 DIAGNOSIS — E53.8 VITAMIN B12 DEFICIENCY: ICD-10-CM

## 2020-07-22 DIAGNOSIS — Z51.81 THERAPEUTIC DRUG MONITORING: Primary | ICD-10-CM

## 2020-07-22 DIAGNOSIS — E06.3 HYPOTHYROIDISM DUE TO HASHIMOTO'S THYROIDITIS: ICD-10-CM

## 2020-07-22 DIAGNOSIS — E03.8 HYPOTHYROIDISM DUE TO HASHIMOTO'S THYROIDITIS: ICD-10-CM

## 2020-07-22 DIAGNOSIS — E55.9 VITAMIN D DEFICIENCY: ICD-10-CM

## 2020-07-22 DIAGNOSIS — E78.2 MIXED HYPERLIPIDEMIA: ICD-10-CM

## 2020-07-22 PROCEDURE — 3078F DIAST BP <80 MM HG: CPT | Performed by: NURSE PRACTITIONER

## 2020-07-22 PROCEDURE — 3008F BODY MASS INDEX DOCD: CPT | Performed by: NURSE PRACTITIONER

## 2020-07-22 PROCEDURE — 99214 OFFICE O/P EST MOD 30 MIN: CPT | Performed by: NURSE PRACTITIONER

## 2020-07-22 PROCEDURE — 3074F SYST BP LT 130 MM HG: CPT | Performed by: NURSE PRACTITIONER

## 2020-07-22 RX ORDER — TOPIRAMATE 25 MG/1
25 TABLET ORAL 2 TIMES DAILY
Qty: 180 TABLET | Refills: 0 | Status: SHIPPED | OUTPATIENT
Start: 2020-07-22 | End: 2020-09-18

## 2020-07-22 RX ORDER — PHENTERMINE HYDROCHLORIDE 37.5 MG/1
37.5 TABLET ORAL
Qty: 30 TABLET | Refills: 1 | Status: SHIPPED | OUTPATIENT
Start: 2020-07-27 | End: 2020-09-18

## 2020-07-22 NOTE — PROGRESS NOTES
HISTORY OF PRESENT ILLNESS  Patient presents with:  Weight Check: up 1 lb    Yung Aleman is a 47year old female here for follow up with medical weight loss program for the treatment of overweight, obesity, or morbid obesity.  Patient has gained -#1  lbs (36.6 °C)   Resp 16   Ht 68\"   Wt 181 lb (82.1 kg)   LMP 02/18/2020   BMI 27.52 kg/m² , Percent body fat: Female 39.1%;  Muscle Mass: 15.9%       GENERAL: well developed, well nourished, in no apparent distress, A/O x3  SKIN: no rashes,no suspicious lesion MCG/0.1ML Nasal Solution, 1 spray by Nasal route once a week., Disp: 1 Bottle, Rfl: 5  Mometasone Furoate (NASONEX NA), 2 sprays by Nasal route every morning., Disp: , Rfl:   Levothyroxine Sodium 112 MCG Oral Tab, Take 1 tablet (112 mcg total) by mouth bef details. · Discussed strategies to overcome barriers to successful weight loss and weight maintenance  · FITTE: ACSM recommendations (150-300 minutes/ week in active weight loss)   · Weight Loss Consent to treat reviewed and signed.     There are no Patien

## 2020-07-22 NOTE — PATIENT INSTRUCTIONS
We are here to support you with weight loss, but please remember that you still need your primary care provider for your routine health maintenance.       PLAN:  Will continue with phentermine 37.5mg and topamax 25mg bid   Follow up with me in 2 month  Sche ** Daily INPUT> Look at nutrition section-- \"nutrients\" and it will break down your macros for the day (ie. Protein, carbs, fibers, sugars and fats). Try to stay within these numbers daily     2.  \"7 minute workout\" to help with exercise/a

## 2020-07-27 ENCOUNTER — TELEPHONE (OUTPATIENT)
Dept: INTERNAL MEDICINE CLINIC | Facility: CLINIC | Age: 55
End: 2020-07-27

## 2020-07-27 NOTE — TELEPHONE ENCOUNTER
Requesting Nascobal  LOV: 7/22/20  RTC: one month  Last Relevant Labs: 2/22/20  Filled: 3/10/20 #1 bottle with 5 refills    Future Appointments   Date Time Provider Dali Moore   9/22/2020  7:40 AM Jair Shanks PA-C [facility] [address]     Re

## 2020-08-24 ENCOUNTER — TELEPHONE (OUTPATIENT)
Dept: FAMILY MEDICINE CLINIC | Facility: CLINIC | Age: 55
End: 2020-08-24

## 2020-08-24 DIAGNOSIS — E55.9 VITAMIN D INSUFFICIENCY: ICD-10-CM

## 2020-08-24 DIAGNOSIS — E04.2 MULTIPLE THYROID NODULES: ICD-10-CM

## 2020-08-24 DIAGNOSIS — E78.2 MIXED HYPERLIPIDEMIA: Primary | ICD-10-CM

## 2020-08-24 NOTE — TELEPHONE ENCOUNTER
Patient is requesting labs prior to her wellness visit. She had a CBC and CMP done on 6/9/20 by Dr Ernesto Guillen. Do you want to order wellness labs?

## 2020-08-24 NOTE — TELEPHONE ENCOUNTER
Let pt know I ordered fasting labs, but no duplicates of the ones she just had. no loss of consciousness, no gait abnormality, no headache, no sensory deficits, and no weakness.

## 2020-08-24 NOTE — TELEPHONE ENCOUNTER
Pt would like order for labs prior to her annual wellness.   Future Appointments   Date Time Provider Dali Moore   9/18/2020  8:20 AM Angel Hodgson [facility] [address]   9/29/2020  9:00 AM Rosalva Leija MD [facility] [address] [facility] [address]

## 2020-08-31 ENCOUNTER — TELEPHONE (OUTPATIENT)
Dept: INTERNAL MEDICINE CLINIC | Facility: CLINIC | Age: 55
End: 2020-08-31

## 2020-09-18 ENCOUNTER — OFFICE VISIT (OUTPATIENT)
Dept: INTERNAL MEDICINE CLINIC | Facility: CLINIC | Age: 55
End: 2020-09-18
Payer: COMMERCIAL

## 2020-09-18 VITALS
SYSTOLIC BLOOD PRESSURE: 122 MMHG | HEART RATE: 72 BPM | BODY MASS INDEX: 26.07 KG/M2 | HEIGHT: 68 IN | DIASTOLIC BLOOD PRESSURE: 80 MMHG | WEIGHT: 172 LBS | RESPIRATION RATE: 16 BRPM | TEMPERATURE: 97 F

## 2020-09-18 DIAGNOSIS — E53.8 VITAMIN B12 DEFICIENCY: ICD-10-CM

## 2020-09-18 DIAGNOSIS — E03.8 HYPOTHYROIDISM DUE TO HASHIMOTO'S THYROIDITIS: ICD-10-CM

## 2020-09-18 DIAGNOSIS — E78.2 MIXED HYPERLIPIDEMIA: ICD-10-CM

## 2020-09-18 DIAGNOSIS — E66.3 OVERWEIGHT (BMI 25.0-29.9): ICD-10-CM

## 2020-09-18 DIAGNOSIS — E55.9 VITAMIN D DEFICIENCY: ICD-10-CM

## 2020-09-18 DIAGNOSIS — Z51.81 THERAPEUTIC DRUG MONITORING: Primary | ICD-10-CM

## 2020-09-18 DIAGNOSIS — E06.3 HYPOTHYROIDISM DUE TO HASHIMOTO'S THYROIDITIS: ICD-10-CM

## 2020-09-18 PROCEDURE — 3074F SYST BP LT 130 MM HG: CPT | Performed by: PHYSICIAN ASSISTANT

## 2020-09-18 PROCEDURE — 99214 OFFICE O/P EST MOD 30 MIN: CPT | Performed by: PHYSICIAN ASSISTANT

## 2020-09-18 PROCEDURE — 3008F BODY MASS INDEX DOCD: CPT | Performed by: PHYSICIAN ASSISTANT

## 2020-09-18 PROCEDURE — 3079F DIAST BP 80-89 MM HG: CPT | Performed by: PHYSICIAN ASSISTANT

## 2020-09-18 RX ORDER — TOPIRAMATE 25 MG/1
25 TABLET ORAL 2 TIMES DAILY
Qty: 180 TABLET | Refills: 0 | Status: SHIPPED | OUTPATIENT
Start: 2020-09-18 | End: 2020-12-30

## 2020-09-18 RX ORDER — PHENTERMINE HYDROCHLORIDE 37.5 MG/1
37.5 TABLET ORAL
Qty: 30 TABLET | Refills: 1 | Status: SHIPPED | OUTPATIENT
Start: 2020-09-18 | End: 2020-11-09

## 2020-09-18 NOTE — PROGRESS NOTES
HISTORY OF PRESENT ILLNESS  Patient presents with:  Weight Check      Cheryl Gipson is a 47year old female here for follow up in medical weight loss program.   Down 9lbs  Compliant on phentermine and topamax  Has been doing a better job taking the second masses or HSM  EXTREMITIES: no cyanosis, no clubbing, no edema    Lab Results   Component Value Date    WBC 7.1 06/09/2020    RBC 4.41 06/09/2020    HGB 12.1 06/09/2020    HCT 38.1 06/09/2020    MCV 86.4 06/09/2020    MCH 27.4 06/09/2020    MCHC 31.8 06/09 once a week., Disp: 1 Bottle, Rfl: 5    •  AZELASTINE HCL 0.1 % Nasal Solution, USE 2 SPRAYS IN EACH NOSTRIL TWICE DAILY, Disp: 1 Bottle, Rfl: 5    No current facility-administered medications on file prior to visit.        ASSESSMENT  Analyzed weight data: verbalizes understanding.     Breanna Cardenas PA-C  9/18/2020

## 2020-09-21 ENCOUNTER — TELEPHONE (OUTPATIENT)
Dept: INTERNAL MEDICINE CLINIC | Facility: CLINIC | Age: 55
End: 2020-09-21

## 2020-09-21 NOTE — TELEPHONE ENCOUNTER
Received a notice in epic that Phentermine cannot have PA done - refill is too soon.  She is not due to refill until on or after 9/24/20  Case ID: [id number]      Payer:  [facility] - Informed[employer]   Case number [id number], requesting authorization of Phenterm
Respiratory (Pediatric)

## 2020-09-24 ENCOUNTER — LAB ENCOUNTER (OUTPATIENT)
Dept: LAB | Age: 55
End: 2020-09-24
Attending: FAMILY MEDICINE
Payer: COMMERCIAL

## 2020-09-24 DIAGNOSIS — E04.2 MULTIPLE THYROID NODULES: ICD-10-CM

## 2020-09-24 DIAGNOSIS — E55.9 VITAMIN D INSUFFICIENCY: ICD-10-CM

## 2020-09-24 DIAGNOSIS — E78.2 MIXED HYPERLIPIDEMIA: ICD-10-CM

## 2020-09-24 LAB
CHOLEST SMN-MCNC: 226 MG/DL (ref ?–200)
HDLC SERPL-MCNC: 63 MG/DL (ref 40–59)
LDLC SERPL CALC-MCNC: 147 MG/DL (ref ?–100)
NONHDLC SERPL-MCNC: 163 MG/DL (ref ?–130)
PATIENT FASTING Y/N/NP: YES
T4 FREE SERPL-MCNC: 1.3 NG/DL (ref 0.8–1.7)
TRIGL SERPL-MCNC: 82 MG/DL (ref 30–149)
TSI SER-ACNC: 0.3 MIU/ML (ref 0.36–3.74)
VIT D+METAB SERPL-MCNC: 60.9 NG/ML (ref 30–100)
VLDLC SERPL CALC-MCNC: 16 MG/DL (ref 0–30)

## 2020-09-24 PROCEDURE — 84439 ASSAY OF FREE THYROXINE: CPT | Performed by: FAMILY MEDICINE

## 2020-09-24 PROCEDURE — 80061 LIPID PANEL: CPT | Performed by: FAMILY MEDICINE

## 2020-09-24 PROCEDURE — 82306 VITAMIN D 25 HYDROXY: CPT | Performed by: FAMILY MEDICINE

## 2020-09-24 PROCEDURE — 36415 COLL VENOUS BLD VENIPUNCTURE: CPT | Performed by: FAMILY MEDICINE

## 2020-09-24 PROCEDURE — 84443 ASSAY THYROID STIM HORMONE: CPT | Performed by: FAMILY MEDICINE

## 2020-09-25 NOTE — PROGRESS NOTES
Staff--  Please contact Мария Hogan as below and place order for TSH, free T4 depending on whether she is taking vitamin supplements. Robert Arias.  Your TSH level is a little low, suggesting your levothyroxine may be a little too high, or if you are taking vitamin

## 2020-09-30 ENCOUNTER — OFFICE VISIT (OUTPATIENT)
Dept: FAMILY MEDICINE CLINIC | Facility: CLINIC | Age: 55
End: 2020-09-30
Payer: COMMERCIAL

## 2020-09-30 ENCOUNTER — TELEPHONE (OUTPATIENT)
Dept: PAIN CLINIC | Facility: CLINIC | Age: 55
End: 2020-09-30

## 2020-09-30 VITALS
BODY MASS INDEX: 26.22 KG/M2 | HEIGHT: 68 IN | TEMPERATURE: 97 F | DIASTOLIC BLOOD PRESSURE: 70 MMHG | WEIGHT: 173 LBS | RESPIRATION RATE: 16 BRPM | OXYGEN SATURATION: 98 % | HEART RATE: 73 BPM | SYSTOLIC BLOOD PRESSURE: 122 MMHG

## 2020-09-30 DIAGNOSIS — Z00.00 ROUTINE MEDICAL EXAM: Primary | ICD-10-CM

## 2020-09-30 DIAGNOSIS — Z23 NEED FOR VACCINATION: ICD-10-CM

## 2020-09-30 DIAGNOSIS — J30.1 SEASONAL ALLERGIC RHINITIS DUE TO POLLEN: ICD-10-CM

## 2020-09-30 DIAGNOSIS — E06.3 HYPOTHYROIDISM DUE TO HASHIMOTO'S THYROIDITIS: ICD-10-CM

## 2020-09-30 DIAGNOSIS — M47.812 FACET ARTHROPATHY, CERVICAL: Primary | ICD-10-CM

## 2020-09-30 DIAGNOSIS — Z12.31 ENCOUNTER FOR SCREENING MAMMOGRAM FOR MALIGNANT NEOPLASM OF BREAST: ICD-10-CM

## 2020-09-30 DIAGNOSIS — Z11.59 ENCOUNTER FOR HEPATITIS C SCREENING TEST FOR LOW RISK PATIENT: ICD-10-CM

## 2020-09-30 DIAGNOSIS — E03.8 HYPOTHYROIDISM DUE TO HASHIMOTO'S THYROIDITIS: ICD-10-CM

## 2020-09-30 DIAGNOSIS — E78.2 MIXED HYPERLIPIDEMIA: ICD-10-CM

## 2020-09-30 DIAGNOSIS — R35.0 URINARY FREQUENCY: ICD-10-CM

## 2020-09-30 PROCEDURE — 90750 HZV VACC RECOMBINANT IM: CPT | Performed by: FAMILY MEDICINE

## 2020-09-30 PROCEDURE — 90472 IMMUNIZATION ADMIN EACH ADD: CPT | Performed by: FAMILY MEDICINE

## 2020-09-30 PROCEDURE — 90686 IIV4 VACC NO PRSV 0.5 ML IM: CPT | Performed by: FAMILY MEDICINE

## 2020-09-30 PROCEDURE — 90471 IMMUNIZATION ADMIN: CPT | Performed by: FAMILY MEDICINE

## 2020-09-30 PROCEDURE — 99396 PREV VISIT EST AGE 40-64: CPT | Performed by: FAMILY MEDICINE

## 2020-09-30 PROCEDURE — 3078F DIAST BP <80 MM HG: CPT | Performed by: FAMILY MEDICINE

## 2020-09-30 PROCEDURE — 3074F SYST BP LT 130 MM HG: CPT | Performed by: FAMILY MEDICINE

## 2020-09-30 PROCEDURE — 3008F BODY MASS INDEX DOCD: CPT | Performed by: FAMILY MEDICINE

## 2020-09-30 RX ORDER — LEVOTHYROXINE SODIUM 0.1 MG/1
100 TABLET ORAL DAILY
Qty: 90 TABLET | Refills: 0 | Status: SHIPPED | OUTPATIENT
Start: 2020-09-30 | End: 2020-12-17

## 2020-09-30 RX ORDER — AZELASTINE 1 MG/ML
2 SPRAY, METERED NASAL 2 TIMES DAILY
Qty: 1 BOTTLE | Refills: 5 | Status: SHIPPED | OUTPATIENT
Start: 2020-09-30

## 2020-09-30 NOTE — TELEPHONE ENCOUNTER
----- Message from Latrell Gilbert sent at 9/29/2020  1:51 PM CDT -----  Regarding: Need updated Physical therapy order Please and thank you  Good afternoon,  Patient called to restart PT as it was cancelled back in April due to the Covid- 19. We need and up

## 2020-09-30 NOTE — PROGRESS NOTES
Patient presents with:  Physical: Influenza vaccine today. Lab Results: done 9/24/20  Medication Request: Azelastine Nasall Spray previously filled by ENT did really help, request refill today.       HPI:  Anup Menon is a 54year old female here today f Not noted by ENT in 2016   • Facet arthropathy, cervical 2/3/2020   • Mixed hyperlipidemia 2017   • PONV (postoperative nausea and vomiting)    • Thyroid nodule     sees Dr. [facility]   • TMJ (temporomandibular joint syndrome)    • Visual impairment     dayne RASH  Sulfamethoxazole W/*    RASH  Social History    Socioeconomic History      Marital status:       Spouse name: Not on file      Number of children: Not on file      Years of education: Not on file      Highest education level: Not on file    Oc about 2x/wk        Bike Helmet: Not Asked        Seat Belt: Yes        Self-Exams: Not Asked    Social History Narrative      Not on file    Family History   Problem Relation Age of Onset   • Allergies Brother    • Allergies Mother    • Other (Nasal screening mammogram for malignant neoplasm of breast  - [facility] [place] 2D+3D SCREENING BILAT (CPT=77067/14422); Future    3. Encounter for hepatitis C screening test for low risk patient  - HCV ANTIBODY; Future    4.  Need for vaccination  - IMMUNIZATION ADMINIST

## 2020-10-07 ENCOUNTER — OFFICE VISIT (OUTPATIENT)
Dept: PHYSICAL THERAPY | Age: 55
End: 2020-10-07
Attending: PHYSICAL MEDICINE & REHABILITATION
Payer: COMMERCIAL

## 2020-10-07 DIAGNOSIS — M47.812 FACET ARTHROPATHY, CERVICAL: ICD-10-CM

## 2020-10-07 PROCEDURE — 97161 PT EVAL LOW COMPLEX 20 MIN: CPT

## 2020-10-07 PROCEDURE — 97110 THERAPEUTIC EXERCISES: CPT

## 2020-10-07 NOTE — PROGRESS NOTES
SPINE EVALUATION:   Referring Physician: Dr. Claudio Berumen  Diagnosis: Cervical Facet Arthropathy     Date of Service: 10/7/2020     PATIENT SUMMARY   Pierre Garcia is a 54year old y/o female who presents to therapy today with complaints of cervical spine mo thoracic spine. Precautions:  None  OBJECTIVE:   Observation/Posture:  The patient presents with a structural assessment of an increase in thoracic kyphosis  Gait: Normal  Neuro Screen: The patient was assessed for MMT, sensation, and reflexes per all m upward. 5. Patient will demonstrate an increase in MLT of pec in order to return to improved postural awareness. Frequency / Duration: Patient will be seen for 2 x/week or a total of 12 visits over a 90 day period.  Treatment will include: Manual Therap

## 2020-10-14 ENCOUNTER — OFFICE VISIT (OUTPATIENT)
Dept: PHYSICAL THERAPY | Age: 55
End: 2020-10-14
Attending: PHYSICAL MEDICINE & REHABILITATION
Payer: COMMERCIAL

## 2020-10-14 DIAGNOSIS — M47.812 FACET ARTHROPATHY, CERVICAL: ICD-10-CM

## 2020-10-14 PROCEDURE — 97140 MANUAL THERAPY 1/> REGIONS: CPT

## 2020-10-14 PROCEDURE — 97110 THERAPEUTIC EXERCISES: CPT

## 2020-10-14 NOTE — PROGRESS NOTES
Dx: Cervical Osteoarthritis         Authorized # of Visits:  12 (PPO)         Next MD visit: none scheduled  Fall Risk: standard         Precautions: n/a             Subjective: Neck is doing OK. States that the exercises are helping.      Objective: Treat

## 2020-10-16 ENCOUNTER — OFFICE VISIT (OUTPATIENT)
Dept: PHYSICAL THERAPY | Age: 55
End: 2020-10-16
Attending: PHYSICAL MEDICINE & REHABILITATION
Payer: COMMERCIAL

## 2020-10-16 DIAGNOSIS — M47.812 FACET ARTHROPATHY, CERVICAL: ICD-10-CM

## 2020-10-16 PROCEDURE — 97140 MANUAL THERAPY 1/> REGIONS: CPT

## 2020-10-16 PROCEDURE — 97110 THERAPEUTIC EXERCISES: CPT

## 2020-10-16 NOTE — PROGRESS NOTES
Dx: Cervical Osteoarthritis         Authorized # of Visits:  12 (PPO)         Next MD visit: none scheduled  Fall Risk: standard         Precautions: n/a             Subjective: Neck is doing OK. States that the exercises are helping.      Objective: Treat 1 (15 min)       Total Timed Treatment: 40 min  Total Treatment Time: 40 min

## 2020-10-19 ENCOUNTER — OFFICE VISIT (OUTPATIENT)
Dept: PHYSICAL THERAPY | Age: 55
End: 2020-10-19
Attending: PHYSICAL MEDICINE & REHABILITATION
Payer: COMMERCIAL

## 2020-10-19 DIAGNOSIS — M47.812 FACET ARTHROPATHY, CERVICAL: ICD-10-CM

## 2020-10-19 PROCEDURE — 97140 MANUAL THERAPY 1/> REGIONS: CPT

## 2020-10-19 PROCEDURE — 97110 THERAPEUTIC EXERCISES: CPT

## 2020-10-19 NOTE — PROGRESS NOTES
Dx: Cervical Osteoarthritis         Authorized # of Visits:  12 (PPO)         Next MD visit: none scheduled  Fall Risk: standard         Precautions: n/a             Subjective: Neck is doing OK. States that the exercises are helping.   Notes an increase i assist with returning to sustained posture and for looking upward. 5. Patient will demonstrate an increase in MLT of pec in order to return to improved postural awareness. Plan: Assess response and progress as tolerated.   Progress Scapular strengthenin

## 2020-10-21 ENCOUNTER — OFFICE VISIT (OUTPATIENT)
Dept: PHYSICAL THERAPY | Age: 55
End: 2020-10-21
Attending: PHYSICAL MEDICINE & REHABILITATION
Payer: COMMERCIAL

## 2020-10-21 DIAGNOSIS — M47.812 FACET ARTHROPATHY, CERVICAL: ICD-10-CM

## 2020-10-21 PROCEDURE — 97110 THERAPEUTIC EXERCISES: CPT

## 2020-10-21 PROCEDURE — 97140 MANUAL THERAPY 1/> REGIONS: CPT

## 2020-10-21 NOTE — PROGRESS NOTES
Dx: Cervical Osteoarthritis         Authorized # of Visits:  12 (PPO)         Next MD visit: none scheduled  Fall Risk: standard         Precautions: n/a             Subjective: Is becoming frustrated with lack of cervical extension with sleep.       Object return to looking over her shoulder. 4. Patient will have an increase in core strength to assist with returning to sustained posture and for looking upward.   5. Patient will demonstrate an increase in MLT of pec in order to return to improved postural a

## 2020-10-28 ENCOUNTER — OFFICE VISIT (OUTPATIENT)
Dept: PHYSICAL THERAPY | Age: 55
End: 2020-10-28
Attending: PHYSICAL MEDICINE & REHABILITATION
Payer: COMMERCIAL

## 2020-10-28 DIAGNOSIS — M47.812 FACET ARTHROPATHY, CERVICAL: ICD-10-CM

## 2020-10-28 PROCEDURE — 97140 MANUAL THERAPY 1/> REGIONS: CPT

## 2020-10-28 PROCEDURE — 97110 THERAPEUTIC EXERCISES: CPT

## 2020-10-28 NOTE — PROGRESS NOTES
Dx: Cervical Osteoarthritis         Authorized # of Visits:  12 (PPO)         Next MD visit: none scheduled  Fall Risk: standard         Precautions: n/a             Subjective: States that she was very sore after last session.   Continues to struggle with bilaterally. Thoracic spine responded to more conservative approach to [facility]. Some relief today. Goals (8 visits):    1. Increase FOTO assessment > 11% from INE to DC.   2. Patient will be aware of postural limitations and be able to correct them

## 2020-10-30 ENCOUNTER — OFFICE VISIT (OUTPATIENT)
Dept: PHYSICAL THERAPY | Age: 55
End: 2020-10-30
Attending: PHYSICAL MEDICINE & REHABILITATION
Payer: COMMERCIAL

## 2020-10-30 DIAGNOSIS — M47.812 FACET ARTHROPATHY, CERVICAL: ICD-10-CM

## 2020-10-30 PROCEDURE — 97140 MANUAL THERAPY 1/> REGIONS: CPT

## 2020-10-30 PROCEDURE — 97110 THERAPEUTIC EXERCISES: CPT

## 2020-10-30 NOTE — PROGRESS NOTES
Dx: Cervical Osteoarthritis         Authorized # of Visits:  12 (PPO)         Next MD visit: none scheduled  Fall Risk: standard         Precautions: n/a             Subjective: States that she is less sore today over last session.   No need for pain medica for bilateral rotation. Thoracic PA manipulation for bilateral rotation. Assessment: Responded well with increased cervical motion bilaterally. Increased subcranial motion after manipulation.   Reinforced the need to progressively     Goals (8

## 2020-11-04 ENCOUNTER — OFFICE VISIT (OUTPATIENT)
Dept: PHYSICAL THERAPY | Age: 55
End: 2020-11-04
Attending: PHYSICAL MEDICINE & REHABILITATION
Payer: COMMERCIAL

## 2020-11-04 PROCEDURE — 97140 MANUAL THERAPY 1/> REGIONS: CPT

## 2020-11-04 PROCEDURE — 97110 THERAPEUTIC EXERCISES: CPT

## 2020-11-05 NOTE — PROGRESS NOTES
Dx: Cervical Osteoarthritis         Authorized # of Visits:  12 (PPO)         Next MD visit: none scheduled  Fall Risk: standard         Precautions: n/a             Subjective: States that her ergonomics at work is doing her well.   States that she had an Posterior glide; Cervical Facet up/downglide C2C3 to C6C7 OA Posterior glide; Cervical Facet up/downglide C2C3 to C6C7 OA Posterior glide; Cervical Facet up/downglide C2C3 to C6C7   Thoracic PA manipulation for bilateral rotation.   Thoracic PA manipulation

## 2020-11-08 NOTE — PROGRESS NOTES
HISTORY OF PRESENT ILLNESS  Patient presents with:  Weight Check: down 4 lbs    Lelon Curling is a 54year old female here for follow up with medical weight loss program for the treatment of overweight, obesity, or morbid obesity.    Patient has lost -# 4 LMP 09/07/2020   BMI 25.54 kg/m²       GENERAL: well developed, well nourished, in no apparent distress, A/O x3  SKIN: no rashes, no suspicious lesions  HEENT: conjunctiva pink, sclera non icteric, PERRLA  NECK: supple, no adenopathy  LUNGS: CTA in all fie Mometasone Furoate (NASONEX NA), 2 sprays by Nasal route every morning., Disp: , Rfl:     •  triamcinolone acetonide 0.1 % External Cream, Apply topically 2 (two) times daily as needed. , Disp: 45 g, Rfl: 1    •  Multiple Vitamins-Minerals (MULTI VITAMIN/MI recommendations (150-300 minutes/ week in active weight loss)   · Weight Loss Consent to treat reviewed and signed. There are no Patient Instructions on file for this visit. Return in about 4 weeks (around 12/7/2020) for weight management.     Patient

## 2020-11-09 ENCOUNTER — OFFICE VISIT (OUTPATIENT)
Dept: INTERNAL MEDICINE CLINIC | Facility: CLINIC | Age: 55
End: 2020-11-09
Payer: COMMERCIAL

## 2020-11-09 ENCOUNTER — OFFICE VISIT (OUTPATIENT)
Dept: PHYSICAL THERAPY | Age: 55
End: 2020-11-09
Attending: PHYSICAL MEDICINE & REHABILITATION
Payer: COMMERCIAL

## 2020-11-09 VITALS
HEART RATE: 74 BPM | RESPIRATION RATE: 16 BRPM | WEIGHT: 168 LBS | SYSTOLIC BLOOD PRESSURE: 120 MMHG | DIASTOLIC BLOOD PRESSURE: 82 MMHG | BODY MASS INDEX: 25.46 KG/M2 | HEIGHT: 68 IN

## 2020-11-09 DIAGNOSIS — E66.3 OVERWEIGHT (BMI 25.0-29.9): ICD-10-CM

## 2020-11-09 DIAGNOSIS — E06.3 HYPOTHYROIDISM DUE TO HASHIMOTO'S THYROIDITIS: ICD-10-CM

## 2020-11-09 DIAGNOSIS — E53.8 VITAMIN B12 DEFICIENCY: ICD-10-CM

## 2020-11-09 DIAGNOSIS — E03.8 HYPOTHYROIDISM DUE TO HASHIMOTO'S THYROIDITIS: ICD-10-CM

## 2020-11-09 DIAGNOSIS — Z51.81 THERAPEUTIC DRUG MONITORING: Primary | ICD-10-CM

## 2020-11-09 DIAGNOSIS — E78.2 MIXED HYPERLIPIDEMIA: ICD-10-CM

## 2020-11-09 DIAGNOSIS — E55.9 VITAMIN D DEFICIENCY: ICD-10-CM

## 2020-11-09 PROCEDURE — 97110 THERAPEUTIC EXERCISES: CPT

## 2020-11-09 PROCEDURE — 99214 OFFICE O/P EST MOD 30 MIN: CPT | Performed by: NURSE PRACTITIONER

## 2020-11-09 PROCEDURE — 97140 MANUAL THERAPY 1/> REGIONS: CPT

## 2020-11-09 PROCEDURE — 3079F DIAST BP 80-89 MM HG: CPT | Performed by: NURSE PRACTITIONER

## 2020-11-09 PROCEDURE — 3074F SYST BP LT 130 MM HG: CPT | Performed by: NURSE PRACTITIONER

## 2020-11-09 PROCEDURE — 99072 ADDL SUPL MATRL&STAF TM PHE: CPT | Performed by: NURSE PRACTITIONER

## 2020-11-09 PROCEDURE — 3008F BODY MASS INDEX DOCD: CPT | Performed by: NURSE PRACTITIONER

## 2020-11-09 RX ORDER — PHENTERMINE HYDROCHLORIDE 37.5 MG/1
37.5 TABLET ORAL
Qty: 30 TABLET | Refills: 1 | Status: SHIPPED | OUTPATIENT
Start: 2020-11-17 | End: 2021-01-29

## 2020-11-09 NOTE — PATIENT INSTRUCTIONS
We are here to support you with weight loss, but please remember that you still need your primary care provider for your routine health maintenance.       PLAN:  Will continue with phentermine 37.5mg and topamax 25mg   Go to the lab for blood work   Follow Activity level: not very active (can't count exercise towards calorie number per day)                   ** Daily INPUT> Look at nutrition section-- \"nutrients\" and it will break down your macros for the day (ie.  Protein, carbs, fibers, suga

## 2020-11-10 NOTE — PROGRESS NOTES
Dx: Cervical Osteoarthritis         Authorized # of Visits:  12 (PPO)         Next MD visit: none scheduled  Fall Risk: standard         Precautions: n/a             Subjective: States that her neck fared well with her bike rides over the weekend.   Indicat postural limitations and be able to correct them independently (progressing). 3. Patient will have an increase in spinal mobility to 75% full motion in order to return to looking over her shoulder (progressing).     4. Patient will have an increase in co

## 2020-11-11 ENCOUNTER — APPOINTMENT (OUTPATIENT)
Dept: PHYSICAL THERAPY | Age: 55
End: 2020-11-11
Attending: PHYSICAL MEDICINE & REHABILITATION
Payer: COMMERCIAL

## 2020-11-15 ENCOUNTER — TELEPHONE (OUTPATIENT)
Dept: INTERNAL MEDICINE CLINIC | Facility: CLINIC | Age: 55
End: 2020-11-15

## 2020-11-15 NOTE — TELEPHONE ENCOUNTER
Received notice in epic regarding phentermine  Other      Payer:  [facility] - In[employer]   Unable to find a match for the first or last name of the prescriber you submitted, please resubmit the request and check that the prescriber's entire first and last n

## 2020-11-18 ENCOUNTER — TELEMEDICINE (OUTPATIENT)
Dept: FAMILY MEDICINE CLINIC | Facility: CLINIC | Age: 55
End: 2020-11-18
Payer: COMMERCIAL

## 2020-11-18 ENCOUNTER — PATIENT MESSAGE (OUTPATIENT)
Dept: FAMILY MEDICINE CLINIC | Facility: CLINIC | Age: 55
End: 2020-11-18

## 2020-11-18 DIAGNOSIS — J30.1 SEASONAL ALLERGIC RHINITIS DUE TO POLLEN: ICD-10-CM

## 2020-11-18 DIAGNOSIS — R05.9 COUGH: ICD-10-CM

## 2020-11-18 DIAGNOSIS — R05.9 COUGH: Primary | ICD-10-CM

## 2020-11-18 PROCEDURE — 99214 OFFICE O/P EST MOD 30 MIN: CPT | Performed by: FAMILY MEDICINE

## 2020-11-18 RX ORDER — MONTELUKAST SODIUM 10 MG/1
10 TABLET ORAL NIGHTLY
Qty: 30 TABLET | Refills: 0 | Status: SHIPPED | OUTPATIENT
Start: 2020-11-18 | End: 2020-12-21

## 2020-11-18 RX ORDER — BENZONATATE 200 MG/1
200 CAPSULE ORAL 3 TIMES DAILY PRN
Qty: 30 CAPSULE | Refills: 0 | Status: SHIPPED | OUTPATIENT
Start: 2020-11-18 | End: 2020-12-21

## 2020-11-18 NOTE — PATIENT INSTRUCTIONS
Coronavirus Disease 2019 (COVID-19): Prevention  The best prevention is to have no contact with the SARS-CoV-2 virus. There is no vaccine yet. During a pandemic, it's especially important to keep up on recommended vaccines for other illnesses.  This is · Don't touch your eyes, nose, or mouth unless you have clean hands. · Don’t kiss someone who is sick. · If you need to cough or sneeze, do it into a tissue. Then throw the tissue into the trash.  If you don't have tissues, cough or sneeze into the bend o · If you need to cough or sneeze, do it into a tissue. Then throw the tissue into the trash. If you don't have tissues, cough or sneeze into the bend of your elbow. · Don't attend public gatherings if possible.  If you do attend public gatherings, follow s · Don't use other people's desks, phones, equipment, or offices, if possible. · Wash your hands often. Use soap and clean, running water for at least 20 seconds. · If you don't have access to soap and water, use an alcohol-based hand  often.  Jigna Khanna · Call your healthcare provider and follow all instructions. Your activities and where you go likely will be restricted for up to 2 weeks. Check your community's instructions about activity restrictions. You may be directed to stay home, or \"self-isolate. © 3274-5493 [employer] [address]. [address] [facility], [address] [place]. All rights reserved. This information is not intended as a substitute for professional medical care. Always follow your healthcare professional's instructions.         Bev Conti

## 2020-11-18 NOTE — TELEPHONE ENCOUNTER
From: Mariposa Aldrich  To: Deon Hoyt MD  Sent: 11/18/2020 11:52 AM CST  Subject: Non-Urgent Medical Question    I have been coughing again this fall. It started in earnest 10 days ago, along with sinus irritation. I have no fever. I am tired.  I hav

## 2020-11-18 NOTE — PROGRESS NOTES
Video Visit- Supriya Shepard  This is a telemedicine visit with live, interactive video and audio.      Seth Davis understands and accepts financial responsibility for any deductible, co-insurance and/or co-pays a Bilateral 12/1/2016    Performed by Mariella Cadena MD at [facility] Bilateral 05/2006    History of Rotator Cuff repair   • SINUS SURGERY    2017    Dr. Irma Page (\"roto rooted, septum deviation correction\")       Family Histo office or going to the ER.  -could be allergies, non-covid viral uri, covid, vs other. Return for we will call with results.       Madeleine Oakes MD      Please note that the following visit was completed using two-way, real-time interactive audio a

## 2020-11-19 ENCOUNTER — APPOINTMENT (OUTPATIENT)
Dept: LAB | Facility: HOSPITAL | Age: 55
End: 2020-11-19
Attending: FAMILY MEDICINE
Payer: COMMERCIAL

## 2020-11-19 DIAGNOSIS — R05.9 COUGH: ICD-10-CM

## 2020-11-20 ENCOUNTER — TELEMEDICINE (OUTPATIENT)
Dept: FAMILY MEDICINE CLINIC | Facility: CLINIC | Age: 55
End: 2020-11-20
Payer: COMMERCIAL

## 2020-11-20 DIAGNOSIS — U07.1 COVID-19 VIRUS INFECTION: Primary | ICD-10-CM

## 2020-11-20 PROCEDURE — 99213 OFFICE O/P EST LOW 20 MIN: CPT | Performed by: FAMILY MEDICINE

## 2020-11-20 RX ORDER — MONTELUKAST SODIUM 10 MG/1
TABLET ORAL
Qty: 90 TABLET | Refills: 0 | OUTPATIENT
Start: 2020-11-20

## 2020-11-20 NOTE — PATIENT INSTRUCTIONS
Coronavirus Disease 2019 (COVID-19): Caring for Yourself or Others   If you or a household member have symptoms of COVID-19, follow these guidelines for preventing spread of the virus, and managing symptoms.    If you think you have COVID-19 symptoms  · S · Tell the healthcare staff about recent travel. This includes local travel on public transport. Staff may need to find other people you have been in contact with. · Follow all instructions the healthcare staff give you.     If you have been diagnosed with There is currently no vaccine or medicine approved to prevent the virus. The FDA has approved an antiviral medicine called remdesivir for people in the hospital. It is for people 12 years and older who weigh more than about 88 pounds (40 kgs).  Remdesivir i If you've had confirmed COVID-19, your healthcare team may ask you to consider donating your plasma. This is called COVID-19 convalescent plasma donation.  Plasma from people fully recovered from COVID-19 may contain antibodies to help fight COVID-19 in peo Your limits are different if you've had COVID-19 in the last 3 months but are fully recovered without symptoms and you have been exposed to someone with COVID-19. If you are symptom-free, you don't need to stay home away from others or be retested.  The CDC When you return to public settings  When you are well enough to go outside your home, consider the CDC's guidance on cloth face masks:     · The CDC advises all people over age 2 to wear cloth face masks in public settings when around people outside of the © 0303-2024 [employer] [address]. All rights reserved. This information is not intended as a substitute for professional medical care. Always follow your healthcare professional's instructions.

## 2020-11-20 NOTE — PROGRESS NOTES
Video Visit- Supriya Shepard  This is a telemedicine visit with live, interactive video and audio.      Angela Aly understands and accepts financial responsibility for any deductible, co-insurance and/or co-pays a Past Surgical History:   Procedure Laterality Date   • ANESTH,ARTHROSCOPY OF HIP  01/23/2019    torn labrum/tendon, Dr. Neptali Blanc   • APPENDECTOMY  2003    20 weeks Pregnant   • BIOPSY OF THYROID,PERCUT  09/10/2018    benign, Dr. Nu Dumont   • COLONOSCOPY  03/ interactive audio and visual communication.  This has been done in good hector to provide continuity of care in the best interest of the provider-patient relationship, due to the ongoing public health crisis/national emergency and because of restrictions of

## 2020-11-20 NOTE — TELEPHONE ENCOUNTER
MONTELUKAST 10MG TABLETS          Will file in chart as: MONTELUKAST SODIUM 10 MG Oral Tab    Sig: TAKE 1 TABLET(10 MG) BY MOUTH EVERY NIGHT    Disp:  90 tablet    Refills:  0 (Pharmacy requested: Not specified)    Start: 11/18/2020    Class: Normal    Non

## 2020-12-10 ENCOUNTER — TELEPHONE (OUTPATIENT)
Dept: FAMILY MEDICINE CLINIC | Facility: CLINIC | Age: 55
End: 2020-12-10

## 2020-12-10 DIAGNOSIS — Z23 NEED FOR SHINGLES VACCINE: Primary | ICD-10-CM

## 2020-12-10 NOTE — TELEPHONE ENCOUNTER
Pt scheduled for nurse visit on 12/11/2020 for Shingrix #2. Shingrix #1: 9/30/2020    Order pended.     Future Appointments   Date Time Provider Dali Moore   12/11/2020  7:30 AM REF SO PFLD REF [facility] Ref PFLD 17   12/11/2020  9:00 AM [facility] 20 NURSE

## 2020-12-11 ENCOUNTER — LAB ENCOUNTER (OUTPATIENT)
Dept: LAB | Age: 55
End: 2020-12-11
Attending: FAMILY MEDICINE
Payer: COMMERCIAL

## 2020-12-11 ENCOUNTER — NURSE ONLY (OUTPATIENT)
Dept: FAMILY MEDICINE CLINIC | Facility: CLINIC | Age: 55
End: 2020-12-11
Payer: COMMERCIAL

## 2020-12-11 DIAGNOSIS — E06.3 HYPOTHYROIDISM DUE TO HASHIMOTO'S THYROIDITIS: ICD-10-CM

## 2020-12-11 DIAGNOSIS — Z23 NEED FOR SHINGLES VACCINE: ICD-10-CM

## 2020-12-11 DIAGNOSIS — E53.8 VITAMIN B12 DEFICIENCY: ICD-10-CM

## 2020-12-11 DIAGNOSIS — Z11.59 ENCOUNTER FOR HEPATITIS C SCREENING TEST FOR LOW RISK PATIENT: ICD-10-CM

## 2020-12-11 DIAGNOSIS — E03.8 HYPOTHYROIDISM DUE TO HASHIMOTO'S THYROIDITIS: ICD-10-CM

## 2020-12-11 PROCEDURE — 82607 VITAMIN B-12: CPT | Performed by: NURSE PRACTITIONER

## 2020-12-11 PROCEDURE — 86803 HEPATITIS C AB TEST: CPT | Performed by: FAMILY MEDICINE

## 2020-12-11 PROCEDURE — 84443 ASSAY THYROID STIM HORMONE: CPT | Performed by: FAMILY MEDICINE

## 2020-12-11 PROCEDURE — 90471 IMMUNIZATION ADMIN: CPT | Performed by: FAMILY MEDICINE

## 2020-12-11 PROCEDURE — 90750 HZV VACC RECOMBINANT IM: CPT | Performed by: FAMILY MEDICINE

## 2020-12-11 PROCEDURE — 84439 ASSAY OF FREE THYROXINE: CPT | Performed by: FAMILY MEDICINE

## 2020-12-11 PROCEDURE — 36415 COLL VENOUS BLD VENIPUNCTURE: CPT | Performed by: FAMILY MEDICINE

## 2020-12-11 NOTE — PROGRESS NOTES
Pt here for second Shingrix vaccine, given in right deltoid. Pt tolerated well with no adverse events.

## 2020-12-14 ENCOUNTER — PATIENT MESSAGE (OUTPATIENT)
Dept: FAMILY MEDICINE CLINIC | Facility: CLINIC | Age: 55
End: 2020-12-14

## 2020-12-14 NOTE — TELEPHONE ENCOUNTER
From: Tex Hashimoto  To: Morgan Anderson MD  Sent: 12/14/2020 3:07 PM CST  Subject: Test Results Question    Interested to know what you suggest on the thyroid results. I was looking back through all my thyroid related results since this came in.  Derrek Butler

## 2020-12-17 ENCOUNTER — TELEMEDICINE (OUTPATIENT)
Dept: FAMILY MEDICINE CLINIC | Facility: CLINIC | Age: 55
End: 2020-12-17
Payer: COMMERCIAL

## 2020-12-17 DIAGNOSIS — E03.8 HYPOTHYROIDISM DUE TO HASHIMOTO'S THYROIDITIS: ICD-10-CM

## 2020-12-17 DIAGNOSIS — E06.3 HYPOTHYROIDISM DUE TO HASHIMOTO'S THYROIDITIS: ICD-10-CM

## 2020-12-17 PROCEDURE — 99213 OFFICE O/P EST LOW 20 MIN: CPT | Performed by: FAMILY MEDICINE

## 2020-12-17 RX ORDER — LEVOTHYROXINE SODIUM 0.1 MG/1
TABLET ORAL
Qty: 51 TABLET | Refills: 0 | Status: CANCELLED | OUTPATIENT
Start: 2020-12-17

## 2020-12-17 RX ORDER — LEVOTHYROXINE SODIUM 112 UG/1
TABLET ORAL
Qty: 90 TABLET | Refills: 0 | OUTPATIENT
Start: 2020-12-17

## 2020-12-17 RX ORDER — LEVOTHYROXINE SODIUM 112 UG/1
112 TABLET ORAL
Qty: 45 TABLET | Refills: 0 | Status: SHIPPED | OUTPATIENT
Start: 2020-12-17 | End: 2021-03-16

## 2020-12-17 RX ORDER — LEVOTHYROXINE SODIUM 0.1 MG/1
TABLET ORAL
Qty: 45 TABLET | Refills: 0 | Status: SHIPPED | OUTPATIENT
Start: 2020-12-17 | End: 2021-03-16

## 2020-12-17 RX ORDER — LEVOTHYROXINE SODIUM 0.1 MG/1
TABLET ORAL
Qty: 90 TABLET | Refills: 0 | OUTPATIENT
Start: 2020-12-17

## 2020-12-17 NOTE — TELEPHONE ENCOUNTER
Requested Prescriptions     Pending Prescriptions Disp Refills   • LEVOTHYROXINE SODIUM 112 MCG Oral Tab [Pharmacy Med Name: LEVOTHYROXINE 0.112MG (112MCG) TABS] 90 tablet 0     Sig: TAKE 1 TABLET(112 MCG) BY MOUTH BEFORE BREAKFAST   • Levothyroxine Sodium

## 2020-12-17 NOTE — PROGRESS NOTES
Video Visit- Supriya Shepard  This is a telemedicine visit with live, interactive video and audio.      Alexandria Denise understands and accepts financial responsibility for any deductible, co-insurance and/or co-pays a ROTATOR CUFF,ACUTE Bilateral 05/2006    History of Rotator Cuff repair   • SINUS SURGERY    2017    Dr. Gauri Garcia (\"roto rooted, septum deviation correction\")       Family History   Problem Relation Age of Onset   • Allergies Brother    • Allergies Mother limitations of this visit as physical examination was limited. Appropriate medical decision-making and tests are ordered as detailed in the plan of care above.

## 2020-12-21 ENCOUNTER — OFFICE VISIT (OUTPATIENT)
Dept: INTERNAL MEDICINE CLINIC | Facility: CLINIC | Age: 55
End: 2020-12-21
Payer: COMMERCIAL

## 2020-12-21 VITALS
HEIGHT: 68 IN | DIASTOLIC BLOOD PRESSURE: 76 MMHG | SYSTOLIC BLOOD PRESSURE: 122 MMHG | WEIGHT: 163 LBS | BODY MASS INDEX: 24.71 KG/M2 | HEART RATE: 80 BPM | RESPIRATION RATE: 16 BRPM

## 2020-12-21 DIAGNOSIS — E03.8 HYPOTHYROIDISM DUE TO HASHIMOTO'S THYROIDITIS: ICD-10-CM

## 2020-12-21 DIAGNOSIS — E66.3 OVERWEIGHT (BMI 25.0-29.9): ICD-10-CM

## 2020-12-21 DIAGNOSIS — E78.2 MIXED HYPERLIPIDEMIA: ICD-10-CM

## 2020-12-21 DIAGNOSIS — Z51.81 THERAPEUTIC DRUG MONITORING: Primary | ICD-10-CM

## 2020-12-21 DIAGNOSIS — E55.9 VITAMIN D DEFICIENCY: ICD-10-CM

## 2020-12-21 DIAGNOSIS — E06.3 HYPOTHYROIDISM DUE TO HASHIMOTO'S THYROIDITIS: ICD-10-CM

## 2020-12-21 DIAGNOSIS — E53.8 VITAMIN B12 DEFICIENCY: ICD-10-CM

## 2020-12-21 PROCEDURE — 99213 OFFICE O/P EST LOW 20 MIN: CPT | Performed by: NURSE PRACTITIONER

## 2020-12-21 PROCEDURE — 3074F SYST BP LT 130 MM HG: CPT | Performed by: NURSE PRACTITIONER

## 2020-12-21 PROCEDURE — 3078F DIAST BP <80 MM HG: CPT | Performed by: NURSE PRACTITIONER

## 2020-12-21 PROCEDURE — 3008F BODY MASS INDEX DOCD: CPT | Performed by: NURSE PRACTITIONER

## 2020-12-21 RX ORDER — TOPIRAMATE 25 MG/1
25 TABLET ORAL 2 TIMES DAILY
Qty: 180 TABLET | Refills: 0 | Status: CANCELLED | OUTPATIENT
Start: 2020-12-21

## 2020-12-21 NOTE — PROGRESS NOTES
HISTORY OF PRESENT ILLNESS  Patient presents with:  Weight Check: lost 5 pounds    Ángel Cohen is a 54year old female here for follow up with medical weight loss program for the treatment of overweight, obesity, or morbid obesity.      Down 5 lbs  Compli adenopathy  LUNGS: CTA in all fields, breathing non labored  CARDIO: RRR without murmur  GI: +BS, NT/ND, no masses or HSM  EXTREMITIES: no cyanosis, no clubbing, no edema  PSYCH: pleasant, cooperative, normal mood and affect    Lab Results   Component Valu Oral Tab, Take 1 tablet (25 mg total) by mouth 2 (two) times daily.  Refer to discharge instructions for dosing., Disp: 180 tablet, Rfl: 0    •  Mometasone Furoate (NASONEX NA), 2 sprays by Nasal route every morning., Disp: , Rfl:     •  triamcinolone aceto success. See patient instruction below for more details.   · Discussed strategies to overcome barriers to successful weight loss and weight maintenance  · FITTE: ACSM recommendations (150-300 minutes/ week in active weight loss)   · Weight Loss Consent to t

## 2020-12-21 NOTE — PATIENT INSTRUCTIONS
We are here to support you with weight loss, but please remember that you still need your primary care provider for your routine health maintenance.       PLAN:  Will continue with phentermine 37.5mg for 1 more month and then decrease dose, continue w/ topa Activity level: not very active (can't count exercise towards calorie number per day)                   ** Daily INPUT> Look at nutrition section-- \"nutrients\" and it will break down your macros for the day (ie.  Protein, carbs, fibers, suga

## 2020-12-29 ENCOUNTER — PATIENT MESSAGE (OUTPATIENT)
Dept: INTERNAL MEDICINE CLINIC | Facility: CLINIC | Age: 55
End: 2020-12-29

## 2020-12-29 DIAGNOSIS — E66.3 OVERWEIGHT (BMI 25.0-29.9): Primary | ICD-10-CM

## 2020-12-30 RX ORDER — TOPIRAMATE 25 MG/1
25 TABLET ORAL 2 TIMES DAILY
Qty: 10 TABLET | Refills: 0 | Status: SHIPPED | OUTPATIENT
Start: 2020-12-30 | End: 2021-02-23

## 2020-12-30 NOTE — TELEPHONE ENCOUNTER
Patient was seen 12/21/20 and to return in 2 months. Will you allow a 5 day supply of topiramate since she forgot bottle.   Pended for approval

## 2020-12-30 NOTE — TELEPHONE ENCOUNTER
From: Clint Rodriguez  To: ANNE MARIE Swanson  Sent: 12/29/2020 7:30 PM CST  Subject: Prescription Question    I am away from home for a few days and forgot my Topamax.  I wondered if it was possible to put in a small partial refill (3 to 5 days) at a ne

## 2021-01-15 ENCOUNTER — TELEPHONE (OUTPATIENT)
Dept: INTERNAL MEDICINE CLINIC | Facility: CLINIC | Age: 56
End: 2021-01-15

## 2021-01-27 ENCOUNTER — PATIENT MESSAGE (OUTPATIENT)
Dept: INTERNAL MEDICINE CLINIC | Facility: CLINIC | Age: 56
End: 2021-01-27

## 2021-01-27 NOTE — TELEPHONE ENCOUNTER
Patient was seen 12/21/20 and it is noted:   Total Weight Loss: 41 lbs  Total weight loss: Down 5 lbs total, Net loss 41 lbs  · Continue with medications: phentermine 37.5mg (has 1 more month of refills and then will decrease dose to 15mg (will send in [place]

## 2021-01-27 NOTE — TELEPHONE ENCOUNTER
From: Lady Montilla  To: ANNE MARIE Ghosh  Sent: 1/27/2021 5:02 AM CST  Subject: Prescription Question    Please send the phentermine prescription for the lower dosage to [facility] now, as we discussed at my last appointment. I need to refill.  I mov

## 2021-01-29 RX ORDER — PHENTERMINE HYDROCHLORIDE 15 MG/1
15 CAPSULE ORAL EVERY MORNING
Qty: 30 CAPSULE | Refills: 1 | Status: SHIPPED | OUTPATIENT
Start: 2021-01-29 | End: 2021-02-23

## 2021-02-01 ENCOUNTER — TELEPHONE (OUTPATIENT)
Dept: INTERNAL MEDICINE CLINIC | Facility: CLINIC | Age: 56
End: 2021-02-01

## 2021-02-01 NOTE — TELEPHONE ENCOUNTER
PA request from [facility] for phentermine on [address] (Key: [id number])  ENTERED AND AWAITING DECISION

## 2021-02-23 ENCOUNTER — OFFICE VISIT (OUTPATIENT)
Dept: INTERNAL MEDICINE CLINIC | Facility: CLINIC | Age: 56
End: 2021-02-23
Payer: COMMERCIAL

## 2021-02-23 VITALS
BODY MASS INDEX: 24.86 KG/M2 | RESPIRATION RATE: 16 BRPM | HEIGHT: 68 IN | WEIGHT: 164 LBS | DIASTOLIC BLOOD PRESSURE: 82 MMHG | SYSTOLIC BLOOD PRESSURE: 116 MMHG | HEART RATE: 76 BPM

## 2021-02-23 DIAGNOSIS — E55.9 VITAMIN D DEFICIENCY: ICD-10-CM

## 2021-02-23 DIAGNOSIS — E66.3 OVERWEIGHT (BMI 25.0-29.9): ICD-10-CM

## 2021-02-23 DIAGNOSIS — E78.2 MIXED HYPERLIPIDEMIA: ICD-10-CM

## 2021-02-23 DIAGNOSIS — E06.3 HYPOTHYROIDISM DUE TO HASHIMOTO'S THYROIDITIS: ICD-10-CM

## 2021-02-23 DIAGNOSIS — Z51.81 THERAPEUTIC DRUG MONITORING: Primary | ICD-10-CM

## 2021-02-23 DIAGNOSIS — E03.8 HYPOTHYROIDISM DUE TO HASHIMOTO'S THYROIDITIS: ICD-10-CM

## 2021-02-23 PROCEDURE — 3079F DIAST BP 80-89 MM HG: CPT | Performed by: NURSE PRACTITIONER

## 2021-02-23 PROCEDURE — 99214 OFFICE O/P EST MOD 30 MIN: CPT | Performed by: NURSE PRACTITIONER

## 2021-02-23 PROCEDURE — 3074F SYST BP LT 130 MM HG: CPT | Performed by: NURSE PRACTITIONER

## 2021-02-23 PROCEDURE — 3008F BODY MASS INDEX DOCD: CPT | Performed by: NURSE PRACTITIONER

## 2021-02-23 RX ORDER — PHENTERMINE HYDROCHLORIDE 15 MG/1
15 CAPSULE ORAL EVERY MORNING
Qty: 30 CAPSULE | Refills: 1 | Status: SHIPPED | OUTPATIENT
Start: 2021-02-23 | End: 2021-05-04

## 2021-02-23 RX ORDER — TOPIRAMATE 25 MG/1
25 TABLET ORAL 2 TIMES DAILY
Qty: 180 TABLET | Refills: 0 | Status: SHIPPED | OUTPATIENT
Start: 2021-02-23 | End: 2021-05-04

## 2021-02-23 NOTE — PROGRESS NOTES
HISTORY OF PRESENT ILLNESS  Patient presents with:  Weight Check: 1 pound weigh gain    Angela Aly is a 54year old female here for follow up with medical weight loss program for the treatment of overweight, obesity, or morbid obesity.      Up #1 lbs  Co adenopathy  LUNGS: CTA in all fields, breathing non labored  CARDIO: RRR without murmur  GI: +BS, NT/ND, no masses or HSM  EXTREMITIES: no cyanosis, no clubbing, no edema  PSYCH: pleasant, cooperative, normal mood and affect    Lab Results   Component Valu topically 2 (two) times daily as needed. , Disp: 45 g, Rfl: 1    •  Multiple Vitamins-Minerals (MULTI VITAMIN/MINERALS) Oral Tab, Take by mouth daily. , Disp: , Rfl:     No current facility-administered medications on file prior to visit.        ASSESSMENT/PL follow-ups on file. Patient verbalizes understanding. ANNE MARIE Monte    Total time spent on chart review, pre-charting, obtaining history, counseling, and educating, reviewing labs was 35 minutes.

## 2021-02-23 NOTE — PATIENT INSTRUCTIONS
We are here to support you with weight loss, but please remember that you still need your primary care provider for your routine health maintenance.       PLAN:  Continue with phentermine 15mg and topamax  Follow up with me in 2 month  Schedule follow up ap ** Daily INPUT> Look at nutrition section-- \"nutrients\" and it will break down your macros for the day (ie. Protein, carbs, fibers, sugars and fats). Try to stay within these numbers daily     2.  \"7 minute workout\" to help with exercise/a

## 2021-03-15 DIAGNOSIS — E03.8 HYPOTHYROIDISM DUE TO HASHIMOTO'S THYROIDITIS: ICD-10-CM

## 2021-03-15 DIAGNOSIS — E06.3 HYPOTHYROIDISM DUE TO HASHIMOTO'S THYROIDITIS: ICD-10-CM

## 2021-03-16 ENCOUNTER — HOSPITAL ENCOUNTER (OUTPATIENT)
Dept: MAMMOGRAPHY | Age: 56
Discharge: HOME | End: 2021-03-16
Attending: FAMILY MEDICINE
Payer: COMMERCIAL

## 2021-03-16 DIAGNOSIS — E06.3 HYPOTHYROIDISM DUE TO HASHIMOTO'S THYROIDITIS: ICD-10-CM

## 2021-03-16 DIAGNOSIS — E03.8 HYPOTHYROIDISM DUE TO HASHIMOTO'S THYROIDITIS: ICD-10-CM

## 2021-03-16 DIAGNOSIS — Z12.31 ENCOUNTER FOR SCREENING MAMMOGRAM FOR MALIGNANT NEOPLASM OF BREAST: ICD-10-CM

## 2021-03-16 PROCEDURE — 77067 SCR MAMMO BI INCL CAD: CPT | Performed by: FAMILY MEDICINE

## 2021-03-16 PROCEDURE — 77063 BREAST TOMOSYNTHESIS BI: CPT | Performed by: FAMILY MEDICINE

## 2021-03-16 RX ORDER — LEVOTHYROXINE SODIUM 112 UG/1
TABLET ORAL
Qty: 51 TABLET | Refills: 0 | OUTPATIENT
Start: 2021-03-16

## 2021-03-16 RX ORDER — LEVOTHYROXINE SODIUM 112 UG/1
112 TABLET ORAL
Qty: 45 TABLET | Refills: 0 | Status: SHIPPED | OUTPATIENT
Start: 2021-03-16 | End: 2021-03-22

## 2021-03-16 RX ORDER — LEVOTHYROXINE SODIUM 0.1 MG/1
TABLET ORAL
Qty: 51 TABLET | Refills: 0 | Status: SHIPPED | OUTPATIENT
Start: 2021-03-16 | End: 2021-03-22

## 2021-03-16 NOTE — TELEPHONE ENCOUNTER
Bhupinder Bill  LEVOTHYROXINE 0.112MG (112MCG) TABS          Will file in chart as: LEVOTHYROXINE SODIUM 112 MCG Oral Tab     Possible duplicate: Carmela to review recent actions on this medication    Sig: TAKE 1 TABLET BY MOUTH BEFORE BREAKFAST ON MONDAY, WEDNESDAY, AND

## 2021-03-16 NOTE — TELEPHONE ENCOUNTER
Requested Prescriptions     Pending Prescriptions Disp Refills   • Levothyroxine Sodium 100 MCG Oral Tab [Pharmacy Med Name: LEVOTHYROXINE 0.100MG (100MCG) TAB] 51 tablet 0     Sig: TAKE 1 TABLET BY MOUTH BEFORE BREAKFAST ON SUNDAY, TUESDAY, THURSDAY AND S

## 2021-03-18 ENCOUNTER — LAB ENCOUNTER (OUTPATIENT)
Dept: LAB | Age: 56
End: 2021-03-18
Attending: FAMILY MEDICINE
Payer: COMMERCIAL

## 2021-03-18 DIAGNOSIS — E03.8 HYPOTHYROIDISM DUE TO HASHIMOTO'S THYROIDITIS: ICD-10-CM

## 2021-03-18 DIAGNOSIS — E06.3 HYPOTHYROIDISM DUE TO HASHIMOTO'S THYROIDITIS: ICD-10-CM

## 2021-03-18 LAB
T4 FREE SERPL-MCNC: 1.1 NG/DL (ref 0.8–1.7)
TSI SER-ACNC: 0.66 MIU/ML (ref 0.36–3.74)

## 2021-03-18 PROCEDURE — 36415 COLL VENOUS BLD VENIPUNCTURE: CPT | Performed by: FAMILY MEDICINE

## 2021-03-18 PROCEDURE — 84439 ASSAY OF FREE THYROXINE: CPT | Performed by: FAMILY MEDICINE

## 2021-03-18 PROCEDURE — 84443 ASSAY THYROID STIM HORMONE: CPT | Performed by: FAMILY MEDICINE

## 2021-05-04 ENCOUNTER — OFFICE VISIT (OUTPATIENT)
Dept: INTERNAL MEDICINE CLINIC | Facility: CLINIC | Age: 56
End: 2021-05-04
Payer: COMMERCIAL

## 2021-05-04 VITALS
RESPIRATION RATE: 16 BRPM | WEIGHT: 162 LBS | HEIGHT: 68 IN | HEART RATE: 72 BPM | BODY MASS INDEX: 24.55 KG/M2 | DIASTOLIC BLOOD PRESSURE: 76 MMHG | SYSTOLIC BLOOD PRESSURE: 122 MMHG

## 2021-05-04 DIAGNOSIS — E78.2 MIXED HYPERLIPIDEMIA: ICD-10-CM

## 2021-05-04 DIAGNOSIS — E06.3 HYPOTHYROIDISM DUE TO HASHIMOTO'S THYROIDITIS: ICD-10-CM

## 2021-05-04 DIAGNOSIS — E66.3 OVERWEIGHT (BMI 25.0-29.9): ICD-10-CM

## 2021-05-04 DIAGNOSIS — E55.9 VITAMIN D DEFICIENCY: ICD-10-CM

## 2021-05-04 DIAGNOSIS — E03.8 HYPOTHYROIDISM DUE TO HASHIMOTO'S THYROIDITIS: ICD-10-CM

## 2021-05-04 DIAGNOSIS — Z51.81 THERAPEUTIC DRUG MONITORING: Primary | ICD-10-CM

## 2021-05-04 DIAGNOSIS — E53.8 VITAMIN B12 DEFICIENCY: ICD-10-CM

## 2021-05-04 PROCEDURE — 99213 OFFICE O/P EST LOW 20 MIN: CPT | Performed by: NURSE PRACTITIONER

## 2021-05-04 PROCEDURE — 3008F BODY MASS INDEX DOCD: CPT | Performed by: NURSE PRACTITIONER

## 2021-05-04 PROCEDURE — 3078F DIAST BP <80 MM HG: CPT | Performed by: NURSE PRACTITIONER

## 2021-05-04 PROCEDURE — 3074F SYST BP LT 130 MM HG: CPT | Performed by: NURSE PRACTITIONER

## 2021-05-04 RX ORDER — PHENTERMINE HYDROCHLORIDE 15 MG/1
15 CAPSULE ORAL EVERY MORNING
Qty: 30 CAPSULE | Refills: 1 | Status: CANCELLED | OUTPATIENT
Start: 2021-05-04

## 2021-05-04 NOTE — PROGRESS NOTES
HISTORY OF PRESENT ILLNESS  Patient presents with:  Weight Check: down 2 lbs     Mejia Rubio is a 54year old female here for follow up with medical weight loss program for the treatment of overweight, obesity, or morbid obesity.      Down 2 lbs  Complian nourished, in no apparent distress, A/O x3  SKIN: no rashes, no suspicious lesions  HEENT: conjunctiva pink, sclera non icteric, PERRLA  NECK: supple, no adenopathy  LUNGS: CTA in all fields, breathing non labored  CARDIO: RRR without murmur  GI: +BS, NT/N sprays by Nasal route every morning., Disp: , Rfl:   triamcinolone acetonide 0.1 % External Cream, Apply topically 2 (two) times daily as needed. , Disp: 45 g, Rfl: 1  Multiple Vitamins-Minerals (MULTI VITAMIN/MINERALS) Oral Tab, Take by mouth daily. , Disp: Instructions on file for this visit. Return in about 4 weeks (around 6/1/2021) for weight management. Patient verbalizes understanding.     Jared Augustine, ANNE MARIE    Total time spent on chart review, pre-charting, obtaining history, counseling, and e

## 2022-05-06 ENCOUNTER — HOSPITAL ENCOUNTER (OUTPATIENT)
Dept: MAMMOGRAPHY | Age: 57
Discharge: HOME | End: 2022-05-06
Attending: FAMILY MEDICINE
Payer: COMMERCIAL

## 2022-05-06 DIAGNOSIS — Z12.31 ENCOUNTER FOR SCREENING MAMMOGRAM FOR BREAST CANCER: ICD-10-CM

## 2022-05-06 PROCEDURE — 77067 SCR MAMMO BI INCL CAD: CPT | Performed by: FAMILY MEDICINE

## 2022-05-06 PROCEDURE — 77063 BREAST TOMOSYNTHESIS BI: CPT | Performed by: FAMILY MEDICINE

## 2022-06-20 NOTE — PATIENT INSTRUCTIONS
Received refill request for:    linaclotide (LINZESS) 145 MCG capsule 90 capsule 3 7/1/2021     Last office visit: 6/5/2022  FUV: 6/23/2022    Okay to refill?   We are here to support you with weight loss, but please remember that you still need your primary care provider for your routine health maintenance.       PLAN:  Stop all medications   Follow up with me as needed   Schedule follow up appointments: [facility] nutrition section-- \"nutrients\" and it will break down your macros for the day (ie. Protein, carbs, fibers, sugars and fats). Try to stay within these numbers daily     2.  \"7 minute workout\" to help with exercise/activity which takes 7 minutes of your

## 2022-09-02 ENCOUNTER — OFFICE VISIT (OUTPATIENT)
Dept: FAMILY MEDICINE CLINIC | Facility: CLINIC | Age: 57
End: 2022-09-02
Payer: COMMERCIAL

## 2022-09-02 VITALS
TEMPERATURE: 98 F | HEIGHT: 68 IN | BODY MASS INDEX: 25.46 KG/M2 | RESPIRATION RATE: 16 BRPM | OXYGEN SATURATION: 98 % | DIASTOLIC BLOOD PRESSURE: 74 MMHG | HEART RATE: 88 BPM | SYSTOLIC BLOOD PRESSURE: 118 MMHG | WEIGHT: 168 LBS

## 2022-09-02 DIAGNOSIS — Z00.00 WELL ADULT EXAM: Primary | ICD-10-CM

## 2022-09-02 DIAGNOSIS — E03.8 HYPOTHYROIDISM DUE TO HASHIMOTO'S THYROIDITIS: ICD-10-CM

## 2022-09-02 DIAGNOSIS — E04.1 THYROID NODULE: ICD-10-CM

## 2022-09-02 DIAGNOSIS — E55.9 VITAMIN D INSUFFICIENCY: ICD-10-CM

## 2022-09-02 DIAGNOSIS — E06.3 HYPOTHYROIDISM DUE TO HASHIMOTO'S THYROIDITIS: ICD-10-CM

## 2022-09-02 DIAGNOSIS — E78.2 MIXED HYPERLIPIDEMIA: ICD-10-CM

## 2022-09-02 DIAGNOSIS — E04.2 MULTIPLE THYROID NODULES: ICD-10-CM

## 2022-09-02 PROBLEM — U07.1 COVID-19 VIRUS INFECTION: Status: RESOLVED | Noted: 2020-11-20 | Resolved: 2022-09-02

## 2022-09-02 PROBLEM — H81.10 BPPV (BENIGN PAROXYSMAL POSITIONAL VERTIGO): Status: RESOLVED | Noted: 2019-09-24 | Resolved: 2022-09-02

## 2022-09-02 PROCEDURE — 3074F SYST BP LT 130 MM HG: CPT | Performed by: FAMILY MEDICINE

## 2022-09-02 PROCEDURE — 3008F BODY MASS INDEX DOCD: CPT | Performed by: FAMILY MEDICINE

## 2022-09-02 PROCEDURE — 99396 PREV VISIT EST AGE 40-64: CPT | Performed by: FAMILY MEDICINE

## 2022-09-02 PROCEDURE — 3078F DIAST BP <80 MM HG: CPT | Performed by: FAMILY MEDICINE

## 2022-09-02 RX ORDER — PHENDIMETRAZINE TARTRATE 35 MG/1
1 TABLET ORAL 2 TIMES DAILY
COMMUNITY
Start: 2022-08-30

## 2022-09-02 RX ORDER — LEVOTHYROXINE SODIUM 112 UG/1
112 TABLET ORAL
Qty: 90 TABLET | Refills: 0 | Status: SHIPPED | OUTPATIENT
Start: 2022-09-02

## 2022-09-02 RX ORDER — LEVOTHYROXINE SODIUM 0.1 MG/1
TABLET ORAL
Qty: 90 TABLET | Refills: 0 | Status: SHIPPED | OUTPATIENT
Start: 2022-09-02

## 2022-09-10 ENCOUNTER — LAB ENCOUNTER (OUTPATIENT)
Dept: LAB | Age: 57
End: 2022-09-10
Attending: FAMILY MEDICINE
Payer: COMMERCIAL

## 2022-09-10 DIAGNOSIS — Z00.00 WELL ADULT EXAM: ICD-10-CM

## 2022-09-10 DIAGNOSIS — E55.9 VITAMIN D INSUFFICIENCY: ICD-10-CM

## 2022-09-10 LAB
ALBUMIN SERPL-MCNC: 3.8 G/DL (ref 3.4–5)
ALBUMIN/GLOB SERPL: 0.9 {RATIO} (ref 1–2)
ALP LIVER SERPL-CCNC: 49 U/L
ALT SERPL-CCNC: 28 U/L
ANION GAP SERPL CALC-SCNC: 3 MMOL/L (ref 0–18)
AST SERPL-CCNC: 28 U/L (ref 15–37)
BASOPHILS # BLD AUTO: 0.05 X10(3) UL (ref 0–0.2)
BASOPHILS NFR BLD AUTO: 0.8 %
BILIRUB SERPL-MCNC: 0.6 MG/DL (ref 0.1–2)
BUN BLD-MCNC: 25 MG/DL (ref 7–18)
CALCIUM BLD-MCNC: 9.4 MG/DL (ref 8.5–10.1)
CHLORIDE SERPL-SCNC: 104 MMOL/L (ref 98–112)
CHOLEST SERPL-MCNC: 196 MG/DL (ref ?–200)
CO2 SERPL-SCNC: 28 MMOL/L (ref 21–32)
CREAT BLD-MCNC: 0.94 MG/DL
EOSINOPHIL # BLD AUTO: 0.08 X10(3) UL (ref 0–0.7)
EOSINOPHIL NFR BLD AUTO: 1.3 %
ERYTHROCYTE [DISTWIDTH] IN BLOOD BY AUTOMATED COUNT: 14.6 %
FASTING PATIENT LIPID ANSWER: YES
FASTING STATUS PATIENT QL REPORTED: YES
GFR SERPLBLD BASED ON 1.73 SQ M-ARVRAT: 71 ML/MIN/1.73M2 (ref 60–?)
GLOBULIN PLAS-MCNC: 4.1 G/DL (ref 2.8–4.4)
GLUCOSE BLD-MCNC: 87 MG/DL (ref 70–99)
HCT VFR BLD AUTO: 40.6 %
HDLC SERPL-MCNC: 70 MG/DL (ref 40–59)
HGB BLD-MCNC: 12.7 G/DL
IMM GRANULOCYTES # BLD AUTO: 0.02 X10(3) UL (ref 0–1)
IMM GRANULOCYTES NFR BLD: 0.3 %
LDLC SERPL CALC-MCNC: 112 MG/DL (ref ?–100)
LYMPHOCYTES # BLD AUTO: 1.86 X10(3) UL (ref 1–4)
LYMPHOCYTES NFR BLD AUTO: 31.2 %
MCH RBC QN AUTO: 28.9 PG (ref 26–34)
MCHC RBC AUTO-ENTMCNC: 31.3 G/DL (ref 31–37)
MCV RBC AUTO: 92.3 FL
MONOCYTES # BLD AUTO: 0.41 X10(3) UL (ref 0.1–1)
MONOCYTES NFR BLD AUTO: 6.9 %
NEUTROPHILS # BLD AUTO: 3.55 X10 (3) UL (ref 1.5–7.7)
NEUTROPHILS # BLD AUTO: 3.55 X10(3) UL (ref 1.5–7.7)
NEUTROPHILS NFR BLD AUTO: 59.5 %
NONHDLC SERPL-MCNC: 126 MG/DL (ref ?–130)
OSMOLALITY SERPL CALC.SUM OF ELEC: 284 MOSM/KG (ref 275–295)
PLATELET # BLD AUTO: 209 10(3)UL (ref 150–450)
POTASSIUM SERPL-SCNC: 4.9 MMOL/L (ref 3.5–5.1)
PROT SERPL-MCNC: 7.9 G/DL (ref 6.4–8.2)
RBC # BLD AUTO: 4.4 X10(6)UL
SODIUM SERPL-SCNC: 135 MMOL/L (ref 136–145)
T4 FREE SERPL-MCNC: 0.9 NG/DL (ref 0.8–1.7)
TRIGL SERPL-MCNC: 79 MG/DL (ref 30–149)
TSI SER-ACNC: 2.58 MIU/ML (ref 0.36–3.74)
VIT D+METAB SERPL-MCNC: 48.2 NG/ML (ref 30–100)
VLDLC SERPL CALC-MCNC: 13 MG/DL (ref 0–30)
WBC # BLD AUTO: 6 X10(3) UL (ref 4–11)

## 2022-09-10 PROCEDURE — 84439 ASSAY OF FREE THYROXINE: CPT | Performed by: FAMILY MEDICINE

## 2022-09-10 PROCEDURE — 82306 VITAMIN D 25 HYDROXY: CPT | Performed by: FAMILY MEDICINE

## 2022-09-10 PROCEDURE — 80061 LIPID PANEL: CPT | Performed by: FAMILY MEDICINE

## 2022-09-10 PROCEDURE — 80050 GENERAL HEALTH PANEL: CPT | Performed by: FAMILY MEDICINE

## 2022-11-21 ENCOUNTER — HOSPITAL ENCOUNTER (OUTPATIENT)
Dept: ULTRASOUND IMAGING | Age: 57
Discharge: HOME | End: 2022-11-21
Attending: FAMILY MEDICINE
Payer: COMMERCIAL

## 2022-11-21 DIAGNOSIS — E04.1 THYROID NODULE: ICD-10-CM

## 2022-11-21 PROCEDURE — 76536 US EXAM OF HEAD AND NECK: CPT | Performed by: FAMILY MEDICINE

## 2023-02-08 ENCOUNTER — PATIENT MESSAGE (OUTPATIENT)
Dept: FAMILY MEDICINE CLINIC | Facility: CLINIC | Age: 58
End: 2023-02-08

## 2023-02-08 DIAGNOSIS — Z01.818 PREOP TESTING: Primary | ICD-10-CM

## 2023-02-08 NOTE — TELEPHONE ENCOUNTER
hip (glute) tendon repair procedure currently scheduled for 3/30 with Dr. Monte Sensing  Lab orders and ekg pended- please advise      Future Appointments   Date Time Provider Dali Moore   3/6/2023  9:30 AM Scott Raymundo DO [facility] [address] [facility] [address]

## 2023-02-08 NOTE — TELEPHONE ENCOUNTER
From: Gil Vera  To: Surekha Lieberman DO  Sent: 2/8/2023 11:05 AM CST  Subject: Pre-op visit information    Please see attached orders from my orthopedic surgeon. This is in reference to an appointment that I have made for Monday March 6. I will send a second message with the last attachment(s).

## 2023-02-08 NOTE — TELEPHONE ENCOUNTER
From: Francois Tay  To: Wilton Veras DO  Sent: 2/8/2023 11:09 AM CST  Subject: pre-op visit last file    Please see attached orders from my orthopedic surgeon. This is in reference to an appointment that I have made for Monday March 6. This is the last attachments. One is a duplicate. Sorry, I couldn't tell which one I had already sent.

## 2023-02-16 DIAGNOSIS — E03.8 HYPOTHYROIDISM DUE TO HASHIMOTO'S THYROIDITIS: ICD-10-CM

## 2023-02-16 DIAGNOSIS — E06.3 HYPOTHYROIDISM DUE TO HASHIMOTO'S THYROIDITIS: ICD-10-CM

## 2023-02-17 RX ORDER — LEVOTHYROXINE SODIUM 0.1 MG/1
TABLET ORAL
Qty: 90 TABLET | Refills: 0 | Status: SHIPPED | OUTPATIENT
Start: 2023-02-17

## 2023-03-03 ENCOUNTER — LABORATORY ENCOUNTER (OUTPATIENT)
Dept: LAB | Age: 58
End: 2023-03-03
Attending: FAMILY MEDICINE
Payer: COMMERCIAL

## 2023-03-03 ENCOUNTER — EKG ENCOUNTER (OUTPATIENT)
Dept: LAB | Age: 58
End: 2023-03-03
Attending: FAMILY MEDICINE
Payer: COMMERCIAL

## 2023-03-03 DIAGNOSIS — Z01.818 PREOP TESTING: ICD-10-CM

## 2023-03-03 LAB
ALBUMIN SERPL-MCNC: 4.2 G/DL (ref 3.4–5)
ALBUMIN/GLOB SERPL: 1.1 {RATIO} (ref 1–2)
ALP LIVER SERPL-CCNC: 45 U/L
ALT SERPL-CCNC: 31 U/L
ANION GAP SERPL CALC-SCNC: 4 MMOL/L (ref 0–18)
AST SERPL-CCNC: 29 U/L (ref 15–37)
BASOPHILS # BLD AUTO: 0.07 X10(3) UL (ref 0–0.2)
BASOPHILS NFR BLD AUTO: 1.3 %
BILIRUB SERPL-MCNC: 0.6 MG/DL (ref 0.1–2)
BUN BLD-MCNC: 19 MG/DL (ref 7–18)
CALCIUM BLD-MCNC: 9.4 MG/DL (ref 8.5–10.1)
CHLORIDE SERPL-SCNC: 105 MMOL/L (ref 98–112)
CO2 SERPL-SCNC: 29 MMOL/L (ref 21–32)
CREAT BLD-MCNC: 0.88 MG/DL
EOSINOPHIL # BLD AUTO: 0.18 X10(3) UL (ref 0–0.7)
EOSINOPHIL NFR BLD AUTO: 3.3 %
ERYTHROCYTE [DISTWIDTH] IN BLOOD BY AUTOMATED COUNT: 14 %
FASTING STATUS PATIENT QL REPORTED: YES
GFR SERPLBLD BASED ON 1.73 SQ M-ARVRAT: 77 ML/MIN/1.73M2 (ref 60–?)
GLOBULIN PLAS-MCNC: 3.7 G/DL (ref 2.8–4.4)
GLUCOSE BLD-MCNC: 95 MG/DL (ref 70–99)
HCT VFR BLD AUTO: 41.4 %
HGB BLD-MCNC: 13 G/DL
IMM GRANULOCYTES # BLD AUTO: 0.01 X10(3) UL (ref 0–1)
IMM GRANULOCYTES NFR BLD: 0.2 %
INR BLD: 1.06 (ref 0.85–1.16)
LYMPHOCYTES # BLD AUTO: 1.96 X10(3) UL (ref 1–4)
LYMPHOCYTES NFR BLD AUTO: 35.8 %
MCH RBC QN AUTO: 28.9 PG (ref 26–34)
MCHC RBC AUTO-ENTMCNC: 31.4 G/DL (ref 31–37)
MCV RBC AUTO: 92 FL
MONOCYTES # BLD AUTO: 0.57 X10(3) UL (ref 0.1–1)
MONOCYTES NFR BLD AUTO: 10.4 %
NEUTROPHILS # BLD AUTO: 2.68 X10 (3) UL (ref 1.5–7.7)
NEUTROPHILS # BLD AUTO: 2.68 X10(3) UL (ref 1.5–7.7)
NEUTROPHILS NFR BLD AUTO: 49 %
OSMOLALITY SERPL CALC.SUM OF ELEC: 288 MOSM/KG (ref 275–295)
PLATELET # BLD AUTO: 223 10(3)UL (ref 150–450)
POTASSIUM SERPL-SCNC: 4.2 MMOL/L (ref 3.5–5.1)
PROT SERPL-MCNC: 7.9 G/DL (ref 6.4–8.2)
PROTHROMBIN TIME: 13.8 SECONDS (ref 11.6–14.8)
RBC # BLD AUTO: 4.5 X10(6)UL
SODIUM SERPL-SCNC: 138 MMOL/L (ref 136–145)
WBC # BLD AUTO: 5.5 X10(3) UL (ref 4–11)

## 2023-03-03 PROCEDURE — 80053 COMPREHEN METABOLIC PANEL: CPT | Performed by: FAMILY MEDICINE

## 2023-03-03 PROCEDURE — 93005 ELECTROCARDIOGRAM TRACING: CPT

## 2023-03-03 PROCEDURE — 93010 ELECTROCARDIOGRAM REPORT: CPT | Performed by: INTERNAL MEDICINE

## 2023-03-03 PROCEDURE — 85025 COMPLETE CBC W/AUTO DIFF WBC: CPT | Performed by: FAMILY MEDICINE

## 2023-03-03 PROCEDURE — 85610 PROTHROMBIN TIME: CPT | Performed by: FAMILY MEDICINE

## 2023-03-05 LAB
ATRIAL RATE: 68 BPM
P AXIS: 76 DEGREES
P-R INTERVAL: 150 MS
Q-T INTERVAL: 420 MS
QRS DURATION: 106 MS
QTC CALCULATION (BEZET): 446 MS
R AXIS: 69 DEGREES
T AXIS: 52 DEGREES
VENTRICULAR RATE: 68 BPM

## 2023-03-06 ENCOUNTER — OFFICE VISIT (OUTPATIENT)
Dept: FAMILY MEDICINE CLINIC | Facility: CLINIC | Age: 58
End: 2023-03-06
Payer: COMMERCIAL

## 2023-03-06 VITALS
BODY MASS INDEX: 28.04 KG/M2 | TEMPERATURE: 97 F | WEIGHT: 185 LBS | SYSTOLIC BLOOD PRESSURE: 108 MMHG | RESPIRATION RATE: 14 BRPM | HEIGHT: 68 IN | OXYGEN SATURATION: 99 % | HEART RATE: 67 BPM | DIASTOLIC BLOOD PRESSURE: 84 MMHG

## 2023-03-06 DIAGNOSIS — Z12.31 SCREENING MAMMOGRAM FOR BREAST CANCER: ICD-10-CM

## 2023-03-06 DIAGNOSIS — Z01.818 PRE-OP EXAM: Primary | ICD-10-CM

## 2023-03-06 PROCEDURE — 3008F BODY MASS INDEX DOCD: CPT | Performed by: FAMILY MEDICINE

## 2023-03-06 PROCEDURE — 3079F DIAST BP 80-89 MM HG: CPT | Performed by: FAMILY MEDICINE

## 2023-03-06 PROCEDURE — 3074F SYST BP LT 130 MM HG: CPT | Performed by: FAMILY MEDICINE

## 2023-03-06 PROCEDURE — 99243 OFF/OP CNSLTJ NEW/EST LOW 30: CPT | Performed by: FAMILY MEDICINE

## 2023-04-11 ENCOUNTER — HOSPITAL ENCOUNTER (OUTPATIENT)
Dept: ULTRASOUND IMAGING | Age: 58
Discharge: HOME | End: 2023-04-11
Attending: PHYSICIAN ASSISTANT
Payer: COMMERCIAL

## 2023-04-11 DIAGNOSIS — R52 PAIN: ICD-10-CM

## 2023-04-11 PROCEDURE — 93971 EXTREMITY STUDY: CPT | Performed by: PHYSICIAN ASSISTANT

## 2023-04-24 ENCOUNTER — TELEMEDICINE (OUTPATIENT)
Dept: FAMILY MEDICINE CLINIC | Facility: CLINIC | Age: 58
End: 2023-04-24

## 2023-04-24 DIAGNOSIS — N95.2 VAGINAL ATROPHY: Primary | ICD-10-CM

## 2023-04-24 PROCEDURE — 99213 OFFICE O/P EST LOW 20 MIN: CPT | Performed by: FAMILY MEDICINE

## 2023-04-24 RX ORDER — ESTRADIOL 0.1 MG/G
CREAM VAGINAL
Qty: 42.5 G | Refills: 3 | Status: SHIPPED | OUTPATIENT
Start: 2023-04-24

## 2023-05-24 ENCOUNTER — HOSPITAL ENCOUNTER (OUTPATIENT)
Dept: MAMMOGRAPHY | Age: 58
Discharge: HOME | End: 2023-05-24
Attending: FAMILY MEDICINE
Payer: COMMERCIAL

## 2023-05-24 DIAGNOSIS — Z12.31 SCREENING MAMMOGRAM FOR BREAST CANCER: ICD-10-CM

## 2023-05-24 PROCEDURE — 77067 SCR MAMMO BI INCL CAD: CPT | Performed by: FAMILY MEDICINE

## 2023-05-24 PROCEDURE — 77063 BREAST TOMOSYNTHESIS BI: CPT | Performed by: FAMILY MEDICINE

## 2023-05-25 ENCOUNTER — OFFICE VISIT (OUTPATIENT)
Dept: INTERNAL MEDICINE CLINIC | Facility: CLINIC | Age: 58
End: 2023-05-25
Payer: COMMERCIAL

## 2023-05-25 VITALS
DIASTOLIC BLOOD PRESSURE: 80 MMHG | HEIGHT: 68 IN | BODY MASS INDEX: 28.04 KG/M2 | HEART RATE: 80 BPM | WEIGHT: 185 LBS | SYSTOLIC BLOOD PRESSURE: 118 MMHG | RESPIRATION RATE: 16 BRPM

## 2023-05-25 DIAGNOSIS — Z51.81 ENCOUNTER FOR THERAPEUTIC DRUG LEVEL MONITORING: Primary | ICD-10-CM

## 2023-05-25 DIAGNOSIS — E55.9 VITAMIN D INSUFFICIENCY: ICD-10-CM

## 2023-05-25 DIAGNOSIS — E66.3 OVERWEIGHT (BMI 25.0-29.9): ICD-10-CM

## 2023-05-25 DIAGNOSIS — E06.3 HYPOTHYROIDISM DUE TO HASHIMOTO'S THYROIDITIS: ICD-10-CM

## 2023-05-25 DIAGNOSIS — E78.2 MIXED HYPERLIPIDEMIA: ICD-10-CM

## 2023-05-25 DIAGNOSIS — E03.8 HYPOTHYROIDISM DUE TO HASHIMOTO'S THYROIDITIS: ICD-10-CM

## 2023-05-25 PROCEDURE — 3008F BODY MASS INDEX DOCD: CPT | Performed by: PHYSICIAN ASSISTANT

## 2023-05-25 PROCEDURE — 3079F DIAST BP 80-89 MM HG: CPT | Performed by: PHYSICIAN ASSISTANT

## 2023-05-25 PROCEDURE — 3074F SYST BP LT 130 MM HG: CPT | Performed by: PHYSICIAN ASSISTANT

## 2023-05-25 PROCEDURE — 99214 OFFICE O/P EST MOD 30 MIN: CPT | Performed by: PHYSICIAN ASSISTANT

## 2023-05-25 NOTE — PROGRESS NOTES
Patient teaching on [facility]. performed. Patient demonstrated back, all questions were answered and patient verbalized understanding.  JANE

## 2023-06-02 ENCOUNTER — PATIENT MESSAGE (OUTPATIENT)
Dept: INTERNAL MEDICINE CLINIC | Facility: CLINIC | Age: 58
End: 2023-06-02

## 2023-06-02 NOTE — TELEPHONE ENCOUNTER
From: Kris Gallardo  To: Bryon Hernandez PA-C  Sent: 6/2/2023 11:11 AM CDT  Subject: Script follow up    Delroy has faxed over a couple requests for information and has not received a reply. Is there something else you need that I can help with? Thanks!   Mame Jacobs

## 2023-06-07 NOTE — TELEPHONE ENCOUNTER
Advanced heart failure Progress Note    Pertinent PMH:     1. Cardiogenic shock   2. Severe ICM  3. CAD w non revascularizable anatomy   4. Ex smoker   5. Homelesness     Subjective    Remains on IABP 1:2  Milrinon gtt 0.375 mcg/kg/min   Phenylephrine 25  Patient denies any chest symptoms, including CP, SOB, palpitations.  ROS positive for back soreness, and feeling \"hot\". Otherwise at baseline.    Hemodynamics:  mPAP ~34  PADP ~25  mvO2 62% (improved from 60% prior)  IABP 1:2  Augment SBP ~115  CVP ~8  Uoutput 855/24hr, net positive 1L    ROS  General/Constitutional: Neg except HPI  EYE: Neg except HPI  ENT: Neg except HPI  CV: Neg except HPI  Pulm: Neg except HPI  GI: Neg except HPI  : Neg except HPI  MS: Neg except HPI  Skin: Neg except HPI  Heme/Lymph: Neg except HPI  Neuro: Neg except HPI  Endo: Neg except HPI  Allergy/Immuno: Neg except HPI  Psych: Neg except HPI  Additional ROS: [All other ROS negative except as noted in HPI]     Current Facility-Administered Medications   Medication Dose Route Frequency Provider Last Rate Last Dose   • LORazepam (ATIVAN) injection 1 mg  1 mg Intravenous Q1H PRN Oziel Fontenot MD   1 mg at 02/09/20 0041   • sodium chloride 0.9% infusion   Intravenous Continuous Graham Gil MD 20 mL/hr at 02/10/20 0700     • PHENYLephrine (ROB-SYNEPHRINE) 50 mg/250 mL in sodium chloride 0.9 % infusion  0-200 mcg/min Intravenous Continuous Larry Gonzalez DO   Stopped at 02/10/20 1021   • HYDROcodone-acetaminophen (NORCO) 5-325 MG per tablet 1 tablet  1 tablet Oral Q6H PRN Oziel Fontenot MD       • HYDROcodone-acetaminophen (NORCO)  MG per tablet 1 tablet  1 tablet Oral Q6H PRN Oziel Fontenot MD   1 tablet at 02/10/20 0649   • AMIODarone (PACERONE,CORDARONE) tablet 400 mg  400 mg Oral 2 times per day Oziel Fontenot MD   400 mg at 02/10/20 0840   • aspirin chewable 81 mg  81 mg Oral Daily Oziel Fontenot MD   81 mg at 02/10/20 0841   • atorvastatin (LIPITOR) tablet 80 mg  80 mg Oral Daily Oziel  PA entered in epic today  Awaiting determination RACHEL Fontenot MD   80 mg at 02/10/20 0840   • Benzocaine-Menthol 15-3.6 MG 1 lozenge  1 lozenge Mouth/Throat PRN Oziel Fontenot MD       • clopidogrel (PLAVIX) tablet 75 mg  75 mg Oral Daily Oziel Fontenot MD   75 mg at 02/10/20 0840   • guaiFENesin-DM (MUCINEX DM) 600-30 mg ER tablet 1 tablet  1 tablet Oral BID PRN Oziel Fontenot MD       • docusate sodium (COLACE) capsule 100 mg  100 mg Oral Nightly Oziel Fontenot MD   100 mg at 02/09/20 1955   • furosemide (LASIX INJECT) 250 mg/125 mL in sodium chloride 0.9 % infusion  20 mg/hr Intravenous Continuous Oziel Fontenot MD   Stopped at 02/09/20 0234   • LORazepam (ATIVAN) tablet 0.5 mg  0.5 mg Oral On Call to Procedure Oziel Fontenot MD       • melatonin tablet 3 mg  3 mg Oral Nightly Oziel Fontenot MD   3 mg at 02/09/20 1954   • milrinone (PRIMACOR) 20 mg/100 mL in dextrose 5 % infusion premix  0.375 mcg/kg/min (Dosing Weight) Intravenous Continuous Oziel Fontenot MD 9.2 mL/hr at 02/10/20 1000 0.375 mcg/kg/min at 02/10/20 1000   • nicotine (NICODERM) 7 MG/24HR patch 1 patch  1 patch Transdermal Daily Oziel Fontenot MD   1 patch at 02/10/20 0842   • pneumococcal 23-valent vaccine (PNEUMOVAX 23, PNU-IMMUNE 25) injection 0.5 mL  0.5 mL Intramuscular On Call PRN Oziel Fontenot MD       • spironolactone (ALDACTONE) tablet 12.5 mg  12.5 mg Oral BID Oziel Fontenot MD   12.5 mg at 02/10/20 0841   • sodium chloride 0.9 % flush bag 2 mL  2 mL Intravenous PRN Oziel Fontenot MD       • dextrose 50 % injection 25 g  25 g Intravenous PRN Graham Gil MD       • dextrose 50 % injection 12.5 g  12.5 g Intravenous PRN Graham Gil MD       • dextrose 5 % infusion   Intravenous Continuous PRN Graham Gil MD       • glucagon (GLUCAGEN) injection 1 mg  1 mg Intramuscular PRN Graham Gil MD       • dextrose (GLUTOSE) 40 % gel 15 g  15 g Oral PRN Graham Gil MD       • dextrose (GLUTOSE) 40 % gel 30 g  30 g Oral PRN Graham Gil MD       • heparin (porcine) 25,000 units/250 mL in dextrose 5 % infusion   0-30 Units/kg/hr (Dosing Weight) Intravenous Continuous Graham Gil MD 14.4 mL/hr at 02/10/20 1000 17.51 Units/kg/hr at 02/10/20 1000   • heparin (porcine) injection 4,000 Units  4,000 Units Intravenous SAMIRA Gil MD       • heparin (porcine) injection 2,000 Units  2,000 Units Intravenous SAMIRA Gil MD             Recent Results (from the past 24 hour(s))   Basic Metabolic Panel    Collection Time: 02/09/20  2:30 PM   Result Value Ref Range    Sodium 134 (L) 135 - 145 mmol/L    Potassium 4.3 3.4 - 5.1 mmol/L    Chloride 95 (L) 98 - 107 mmol/L    Carbon Dioxide 32 21 - 32 mmol/L    Anion Gap 11 10 - 20 mmol/L    Glucose 170 (H) 65 - 99 mg/dL    BUN 29 (H) 6 - 20 mg/dL    Creatinine 1.24 (H) 0.67 - 1.17 mg/dL    GFR Estimate,  68     GFR Estimate, Non African American 59     BUN/Creatinine Ratio 23 7 - 25    CALCIUM 9.5 8.4 - 10.2 mg/dL   CBC with Automated Differential    Collection Time: 02/10/20  4:16 AM   Result Value Ref Range    WBC 16.9 (H) 4.2 - 11.0 K/mcL    RBC 3.71 (L) 4.50 - 5.90 mil/mcL    HGB 12.1 (L) 13.0 - 17.0 g/dL    HCT 36.7 (L) 39.0 - 51.0 %    MCV 98.9 78.0 - 100.0 fl    MCH 32.6 26.0 - 34.0 pg    MCHC 33.0 32.0 - 36.5 g/dL    RDW-CV 12.8 11.0 - 15.0 %     140 - 450 K/mcL    NRBC 0 0 /100 WBC    DIFF TYPE AUTO DIFF verified by manual smear review.     Neutrophil 61 %    LYMPH 25 %    MONO 9 %    EOSIN 3 %    BASO 1 %    Percent Immature Granuloctyes 1 %    Absolute Neutrophil 10.6 (H) 1.8 - 7.7 K/mcL    Absolute Lymph 4.2 (H) 1.0 - 4.0 K/mcL    Absolute Mono 1.4 (H) 0.3 - 0.9 K/mcL    Absolute Eos 0.5 0.1 - 0.5 K/mcL    Absolute Baso 0.1 0.0 - 0.3 K/mcL    Absolute Immature Granulocytes 0.1 0 - 0.2 K/mcl   Basic Metabolic Panel    Collection Time: 02/10/20  4:16 AM   Result Value Ref Range    Sodium 132 (L) 135 - 145 mmol/L    Potassium 4.0 3.4 - 5.1 mmol/L    Chloride 96 (L) 98 - 107 mmol/L    Carbon Dioxide 31 21 - 32 mmol/L    Anion Gap 9 (L) 10 - 20  mmol/L    Glucose 123 (H) 65 - 99 mg/dL    BUN 29 (H) 6 - 20 mg/dL    Creatinine 1.13 0.67 - 1.17 mg/dL    GFR Estimate,  76     GFR Estimate, Non African American 66     BUN/Creatinine Ratio 26 (H) 7 - 25    CALCIUM 9.0 8.4 - 10.2 mg/dL   O2 Saturation, Venous    Collection Time: 02/10/20  4:16 AM   Result Value Ref Range    sO2 Venous 62 60 - 80 %    fO2HB Venous 61 60 - 80 %   Magnesium    Collection Time: 02/10/20  4:16 AM   Result Value Ref Range    MAGNESIUM 2.2 1.7 - 2.4 mg/dL   Phosphorus    Collection Time: 02/10/20  4:16 AM   Result Value Ref Range    PHOSPHORUS 3.8 2.4 - 4.7 mg/dL   Partial Thromboplastin Time    Collection Time: 02/10/20  4:16 AM   Result Value Ref Range    PTT 78 (H) 22 - 32 sec     Physical Exam  Visit Vitals  BP 91/48   Pulse (!) 105   Temp 97 °F (36.1 °C) (Temporal)   Resp (!) 27   Ht 6' 0.05\" (1.83 m)   Wt 82.2 kg (181 lb 3.5 oz)   SpO2 96%   BMI 24.55 kg/m²     General: Alert, no acute distress  Skin: Warm, dry, intact  Eyes: PERRLA, EOMI, no icterus no injection  Head: NCAT  ENMT:Oral mucosa moist, no pharyngeal erythema or exudate  Chest Wall: no tenderness, no clear deformity  Back: Nontender, normal ROM  Cardiovascular: Tachycardic ,1+ aniket LE edema, no murmurs, IABP   Pulm:  Dec BS bases  GI: Soft, nontender, nondistended, normal BS, no organomegaly  : no suprapubic tenderness  MS: ROM limite ddue to bed rest   Neuro: AxOx4, no gross focal deficits  Psych: Normal affect             Echocardiogram:  1. Left ventricle: The cavity size is dilated. Wall thickness is normal.   Systolic function is severely reduced. The estimated ejection fraction   is 20-25%, by biplane method of disks. Doppler parameters are   consistent with abnormal left ventricular relaxation (grade 1 diastolic   dysfunction).2. Regional wall motion abnormalities: Severe hypokinesis of the mid   anteroseptal, apical septal, and apical myocardium;  hypokinesis of the   mid anterior, mid  inferoseptal, and mid anterolateral myocardium.3. Aortic valve: Structurally normal valve. The valve is trileaflet. The   leaflets are normal thickness. Transvalvular velocity is within the   normal range. There is no stenosis. No regurgitation.4. Mitral valve: Transvalvular velocity is within the normal range. There   is no evidence for stenosis. Mild regurgitation.5. Left atrium: The atrium is dilated.6.  Right ventricle: The estimated peak pressure is >= 47mm Hg.    Cardiac cath:    1.  95% proximal diagonal artery stenosis status post Aida BLAZE placement size 2.25 x 28 mm, and CHAD II flow due to no reflow.   2.  100%  of mid LAD and mid RCA.  There is significant left to right distal collateralization.    3.  Elevated filling pressures with pulmonary capillary wedge pressure of 26, and PA mean pressure of 32 on IABP.    4.  Reduced cardiac output of 3.5, cardiac index 1.8.     AP    # Cardiogenic Shock  secondary to ICM (HFrEF 15-20% LVEF),  INTERMAC 2-3, NYHA IV, ACC Stage D  unfortunately he has no good long term solution, his MVO2 is suboptimal despite IABP, high dose inotropes   pts wedge pressures went up  Again when IABP was weaned     # NSTEMI secondary to late presenting extensive anterior myocardial infarction  S/p PCI of Diag   On ASA, plavix and statin     # Recurrent atrial tachycardia  On po amiodarone     4. Persistent leukocytosis   No overt infection     Plan:    Will wean IABP 1: 3 and recheck filling pressures and MVO2 and lactate   Wean off phenylephrine   Start lasix 40mg IV TID   Increase spironolactone to 25mg PO QD  Continue milrinone gtt at 0.375  Will continue to try to wean off IABP   Continue heparin gtt, DAPT  He is not a candidate for heart transplant or LVAD   He looks stable with overall improving mixed venous, holding off on escalation of care to Impella or ECMO as pt has no long term solution as these will be a  bridge to no where     Discussed with Dr. Espitia. Advanced  Heart failure service will follow. Please call with questions.    Pt seen and examined with fellow   Agree with assessment and plan as documented above   Pt still critically ill with high PCWP and compromised CO   His best chance of coming off MCS would be if we can diurese him aggressively   Will afterload reduce him and diurese   His PCWP went up to 29 with IABP 1: 3 , will resume 1:1 and re attempt tomorrow AM    Nigel Espitia MD.   Advanced Heart Failure/ Transplant Cardiology   [facility]  [facility]

## 2023-06-12 DIAGNOSIS — E03.8 HYPOTHYROIDISM DUE TO HASHIMOTO'S THYROIDITIS: ICD-10-CM

## 2023-06-12 DIAGNOSIS — E06.3 HYPOTHYROIDISM DUE TO HASHIMOTO'S THYROIDITIS: ICD-10-CM

## 2023-06-13 DIAGNOSIS — E06.3 HYPOTHYROIDISM DUE TO HASHIMOTO'S THYROIDITIS: ICD-10-CM

## 2023-06-13 DIAGNOSIS — E03.8 HYPOTHYROIDISM DUE TO HASHIMOTO'S THYROIDITIS: ICD-10-CM

## 2023-06-13 RX ORDER — LEVOTHYROXINE SODIUM 112 UG/1
TABLET ORAL
Qty: 30 TABLET | Refills: 0 | Status: SHIPPED | OUTPATIENT
Start: 2023-06-13

## 2023-06-13 RX ORDER — LEVOTHYROXINE SODIUM 0.1 MG/1
TABLET ORAL
Qty: 30 TABLET | Refills: 0 | Status: SHIPPED | OUTPATIENT
Start: 2023-06-13

## 2023-06-13 RX ORDER — LEVOTHYROXINE SODIUM 0.1 MG/1
TABLET ORAL
Qty: 90 TABLET | Refills: 0 | OUTPATIENT
Start: 2023-06-13

## 2023-06-13 RX ORDER — LEVOTHYROXINE SODIUM 112 UG/1
TABLET ORAL
Qty: 90 TABLET | Refills: 0 | OUTPATIENT
Start: 2023-06-13

## 2023-06-23 ENCOUNTER — TELEPHONE (OUTPATIENT)
Dept: INTERNAL MEDICINE CLINIC | Facility: CLINIC | Age: 58
End: 2023-06-23

## 2023-06-23 RX ORDER — PEN NEEDLE, DIABETIC 30 GX3/16"
1 NEEDLE, DISPOSABLE MISCELLANEOUS DAILY
Qty: 100 EACH | Refills: 1 | Status: SHIPPED | OUTPATIENT
Start: 2023-06-23

## 2023-06-23 RX ORDER — LIRAGLUTIDE 6 MG/ML
3 INJECTION, SOLUTION SUBCUTANEOUS DAILY
Qty: 15 ML | Refills: 1 | Status: SHIPPED | OUTPATIENT
Start: 2023-06-23

## 2023-07-01 ENCOUNTER — MED REC SCAN ONLY (OUTPATIENT)
Dept: FAMILY MEDICINE CLINIC | Facility: CLINIC | Age: 58
End: 2023-07-01

## 2023-07-31 RX ORDER — LIRAGLUTIDE 6 MG/ML
3 INJECTION, SOLUTION SUBCUTANEOUS DAILY
Qty: 15 ML | Refills: 1 | Status: SHIPPED | OUTPATIENT
Start: 2023-07-31

## 2023-07-31 NOTE — TELEPHONE ENCOUNTER
Requesting   Requested Prescriptions     Pending Prescriptions Disp Refills    SAXENDA 18 MG/3ML Subcutaneous Solution Pen-injector 15 mL 1     Sig: Inject 3 mg into the skin daily. Week 1- 0.6mg daily. Week 2- 1.2mg daily. Week 3- 1.8mg daily. Week 4- 2.4mg daily. Week 5- 3mg daily.      LOV: 5/25/23  RTC: not noted  Filled: 6/23/23 #15 with 1 refills    Future Appointments   Date Time Provider Dali Moore   9/20/2023 11:20 AM Brandi Contreras PA-C [address] [facility] [address]

## 2023-08-02 DIAGNOSIS — E03.8 HYPOTHYROIDISM DUE TO HASHIMOTO'S THYROIDITIS: ICD-10-CM

## 2023-08-02 DIAGNOSIS — E06.3 HYPOTHYROIDISM DUE TO HASHIMOTO'S THYROIDITIS: ICD-10-CM

## 2023-08-02 RX ORDER — LEVOTHYROXINE SODIUM 0.1 MG/1
TABLET ORAL
Qty: 30 TABLET | Refills: 0 | Status: CANCELLED | OUTPATIENT
Start: 2023-08-02

## 2023-08-02 RX ORDER — LEVOTHYROXINE SODIUM 0.1 MG/1
TABLET ORAL
Qty: 48 TABLET | Refills: 0 | Status: SHIPPED | OUTPATIENT
Start: 2023-08-02 | End: 2023-08-02

## 2023-08-02 RX ORDER — LEVOTHYROXINE SODIUM 112 UG/1
TABLET ORAL
Qty: 30 TABLET | Refills: 0 | Status: CANCELLED | OUTPATIENT
Start: 2023-08-02

## 2023-08-02 RX ORDER — LEVOTHYROXINE SODIUM 0.1 MG/1
TABLET ORAL
Qty: 51 TABLET | Refills: 0 | Status: SHIPPED | OUTPATIENT
Start: 2023-08-02

## 2023-08-02 RX ORDER — LEVOTHYROXINE SODIUM 112 UG/1
TABLET ORAL
Qty: 36 TABLET | Refills: 0 | Status: SHIPPED | OUTPATIENT
Start: 2023-08-02 | End: 2023-08-02

## 2023-08-02 RX ORDER — LEVOTHYROXINE SODIUM 112 UG/1
TABLET ORAL
Qty: 38 TABLET | Refills: 0 | Status: SHIPPED | OUTPATIENT
Start: 2023-08-02

## 2023-08-02 NOTE — TELEPHONE ENCOUNTER
Requested Renewals     levothyroxine 112 MCG Oral Tab          Possible duplicate: Carmela to review recent actions on this medication    Sig: N/A    Disp: 30 tablet    Refills: 0    Start: 8/2/2023    Class: Normal    Non-formulary For: Hypothyroidism due to Hashimoto's thyroiditis    To pharmacy: # 30 ONLY NEEDS LABS    Last ordered: 1 month ago by Kasia Wick DO     Patient comment:  insurance will want a 90 day supply    Thyroid Supplements Protocol [id number]/02/2023 07:21 AM   Protocol Details TSH test in past 12 months    TSH value between 0.350 and 5.500 IU/ml    Appointment in past 12 or next 3 months       levothyroxine 100 MCG Oral Tab          Possible duplicate: Hover to review recent actions on this medication    Sig: TAKE 1 TABLET BY MOUTH BEFORE BREAKFAST ON [address]: 30 tablet    Refills: 0    Start: 8/2/2023    Class: Normal    Non-formulary For: Hypothyroidism due to Hashimoto's thyroiditis    To pharmacy: # 30 ONLY, NEEDS LABS    Last ordered: 1 month ago by Kasia Wick DO     Patient comment: insurance will want a 90 day supply    Thyroid Supplements Protocol [id number]/02/2023 07:21 AM   Protocol Details TSH test in past 12 months    TSH value between 0.350 and 5.500 IU/ml    Appointment in past 12 or next 3 months             Future Appointments   Date Time Provider Dali Moore   9/20/2023 11:20 AM Yan Joe PA-C [address] [facility] [address]     LOV: 4/24/23 telemedicine  Last physical done 9/2/22    RX sent.

## 2023-08-02 NOTE — TELEPHONE ENCOUNTER
Name from pharmacy: LEVOTHYROXINE 0.112MG (112MCG) TABS         Will file in chart as: LEVOTHYROXINE 112 MCG Oral Tab     Possible duplicate: Hover to review recent actions on this medication    Sig: TAKE 1 TABLET BY [address], WEDNESDAY, FRIDAY ONLY    Disp: 38 tablet    Refills: 0 (Pharmacy requested: Not specified)    Start: 8/2/2023    Class: Normal    Non-formulary For: Hypothyroidism due to Hashimoto's thyroiditis    To pharmacy: **Patient requests 90 days supply**    Last ordered: Today by Kira Celeste DO Last refill: 8/2/2023    Rx #: [id number]    Thyroid Supplements Protocol [id number]/02/2023 09:32 AM   Protocol Details TSH test in past 12 months    TSH value between 0.350 and 5.500 IU/ml    Appointment in past 12 or next 3 months       Name from pharmacy: LEVOTHYROXINE 0.100MG (100MCG) TAB         Will file in chart as: LEVOTHYROXINE 100 MCG Oral Tab     Possible duplicate: Hover to review recent actions on this medication    Sig: TAKE 1 TABLET BY MOUTH BEFORE BREAKFAST ON SUNDAY,TUESDAY,THURSDAY AND SATURDAY    Original sig: TAKE 1 TABLET BY MOUTH BEFORE BREAKFAST, ON SUNDAY, TUESDAY, THURSDAY AND SATURDAY    Disp: 51 tablet    Refills: 0 (Pharmacy requested: Not specified)    Start: 8/2/2023    Class: Normal    Non-formulary For: Hypothyroidism due to Hashimoto's thyroiditis    To pharmacy: **Patient requests 90 days supply**    Last ordered: Today by Kira Celeste DO Last refill: 8/2/2023    Rx #: [id number]    Thyroid Supplements Protocol [id number]/02/2023 09:32 AM   Protocol Details TSH test in past 12 months    TSH value between 0.350 and 5.500 IU/ml    Appointment in past 12 or next 3 months      To be filled at: [address], [phone], [phone]     Pt requesting 90 day supply  #36/48 sent over today  Huang Morales for 90?

## 2023-10-23 ENCOUNTER — LAB ENCOUNTER (OUTPATIENT)
Dept: LAB | Age: 58
End: 2023-10-23
Attending: FAMILY MEDICINE

## 2023-10-23 DIAGNOSIS — E03.8 HYPOTHYROIDISM DUE TO HASHIMOTO'S THYROIDITIS: ICD-10-CM

## 2023-10-23 DIAGNOSIS — E06.3 HYPOTHYROIDISM DUE TO HASHIMOTO'S THYROIDITIS: ICD-10-CM

## 2023-10-23 LAB
T4 FREE SERPL-MCNC: 0.9 NG/DL (ref 0.8–1.7)
TSI SER-ACNC: 4.41 MIU/ML (ref 0.36–3.74)

## 2023-10-23 PROCEDURE — 84443 ASSAY THYROID STIM HORMONE: CPT | Performed by: FAMILY MEDICINE

## 2023-10-23 PROCEDURE — 84439 ASSAY OF FREE THYROXINE: CPT | Performed by: FAMILY MEDICINE

## 2023-10-23 PROCEDURE — 80061 LIPID PANEL: CPT | Performed by: FAMILY MEDICINE

## 2023-10-25 ENCOUNTER — OFFICE VISIT (OUTPATIENT)
Dept: FAMILY MEDICINE CLINIC | Facility: CLINIC | Age: 58
End: 2023-10-25

## 2023-10-25 VITALS
DIASTOLIC BLOOD PRESSURE: 62 MMHG | HEART RATE: 71 BPM | HEIGHT: 68 IN | TEMPERATURE: 98 F | WEIGHT: 189 LBS | OXYGEN SATURATION: 99 % | RESPIRATION RATE: 16 BRPM | BODY MASS INDEX: 28.64 KG/M2 | SYSTOLIC BLOOD PRESSURE: 100 MMHG

## 2023-10-25 DIAGNOSIS — Z12.4 ROUTINE CERVICAL SMEAR: ICD-10-CM

## 2023-10-25 DIAGNOSIS — E06.3 HYPOTHYROIDISM DUE TO HASHIMOTO'S THYROIDITIS: ICD-10-CM

## 2023-10-25 DIAGNOSIS — N95.2 VAGINAL ATROPHY: ICD-10-CM

## 2023-10-25 DIAGNOSIS — E03.8 HYPOTHYROIDISM DUE TO HASHIMOTO'S THYROIDITIS: ICD-10-CM

## 2023-10-25 DIAGNOSIS — E04.2 MULTIPLE THYROID NODULES: ICD-10-CM

## 2023-10-25 DIAGNOSIS — Z12.4 SCREENING FOR CERVICAL CANCER: ICD-10-CM

## 2023-10-25 DIAGNOSIS — E78.2 MIXED HYPERLIPIDEMIA: ICD-10-CM

## 2023-10-25 DIAGNOSIS — Z01.419 WELL WOMAN EXAM WITH ROUTINE GYNECOLOGICAL EXAM: Primary | ICD-10-CM

## 2023-10-25 DIAGNOSIS — Z23 NEED FOR IMMUNIZATION AGAINST INFLUENZA: ICD-10-CM

## 2023-10-25 LAB
CHOLEST SERPL-MCNC: 233 MG/DL (ref ?–200)
HDLC SERPL-MCNC: 75 MG/DL (ref 40–59)
LDLC SERPL CALC-MCNC: 146 MG/DL (ref ?–100)
NONHDLC SERPL-MCNC: 158 MG/DL (ref ?–130)
TRIGL SERPL-MCNC: 70 MG/DL (ref 30–149)
VLDLC SERPL CALC-MCNC: 13 MG/DL (ref 0–30)

## 2023-10-25 PROCEDURE — 3008F BODY MASS INDEX DOCD: CPT | Performed by: FAMILY MEDICINE

## 2023-10-25 PROCEDURE — 87624 HPV HI-RISK TYP POOLED RSLT: CPT | Performed by: FAMILY MEDICINE

## 2023-10-25 PROCEDURE — 3078F DIAST BP <80 MM HG: CPT | Performed by: FAMILY MEDICINE

## 2023-10-25 PROCEDURE — 90471 IMMUNIZATION ADMIN: CPT | Performed by: FAMILY MEDICINE

## 2023-10-25 PROCEDURE — 99213 OFFICE O/P EST LOW 20 MIN: CPT | Performed by: FAMILY MEDICINE

## 2023-10-25 PROCEDURE — 99396 PREV VISIT EST AGE 40-64: CPT | Performed by: FAMILY MEDICINE

## 2023-10-25 PROCEDURE — 3074F SYST BP LT 130 MM HG: CPT | Performed by: FAMILY MEDICINE

## 2023-10-25 PROCEDURE — 90686 IIV4 VACC NO PRSV 0.5 ML IM: CPT | Performed by: FAMILY MEDICINE

## 2023-10-25 RX ORDER — LEVOTHYROXINE SODIUM 112 UG/1
112 TABLET ORAL
Qty: 90 TABLET | Refills: 0 | Status: SHIPPED | OUTPATIENT
Start: 2023-10-25 | End: 2024-01-23

## 2023-10-25 RX ORDER — LEVOTHYROXINE SODIUM 0.1 MG/1
TABLET ORAL
Qty: 24 TABLET | Refills: 0 | Status: CANCELLED | OUTPATIENT
Start: 2023-10-25

## 2023-10-25 RX ORDER — ESTRADIOL 0.1 MG/G
1 CREAM VAGINAL
Qty: 42.5 G | Refills: 3 | Status: SHIPPED | OUTPATIENT
Start: 2023-10-25 | End: 2024-11-25

## 2023-10-25 RX ORDER — FLASH GLUCOSE SENSOR
KIT MISCELLANEOUS
Qty: 1 EACH | Refills: 0 | Status: SHIPPED | OUTPATIENT
Start: 2023-10-25

## 2023-10-25 NOTE — PROGRESS NOTES
Note to patient: The [id number] [facility] makes medical notes like these available to patients in the interest of transparency. However, be advised this is a medical document. It is intended as peer to peer communication. It is written in medical language and may contain abbreviations or verbiage that are unfamiliar. It may appear blunt or direct. Medical documents are intended to carry relevant information, facts as evident, and the clinical opinion of the practitioner. Patient presents with:  Physical  Pap      HPI:  28-year-old female who is presenting here for well woman/pap smear. .  She has a history of Hashimoto's hypothyroidism. She is on alternating dose of levothyroxine 100 mcg and 1 112 mcg. She is also seeing weight loss clinic and is on saxenda. She goes to  weight loss clinic. Follows a very healthy diet and exercises about 2 times a week. She reports feeling tired, weight gain. Denies any irritability,  palpitations. Recent labs show elevated tsh > 4. Will recheck in 6 weeks. She is on vaginal estrogen. This helps. She would like to use it more than 2 x per week as she still deals with some dryness. Sh eis using vaginal moisturizer. - advised on diet/exercise    She wants a freestyle [place]- does not care if it's covered and wants to pay for it out of pocket to monitor her check. Wt Readings from Last 6 Encounters:  05/25/23 : 185 lb (83.9 kg)  03/06/23 : 185 lb (83.9 kg)  09/02/22 : 168 lb (76.2 kg)  05/04/21 : 162 lb (73.5 kg)  02/23/21 : 164 lb (74.4 kg)  12/21/20 : 163 lb (73.9 kg)     Smoking: none  Alcohol: occasionally   Drugs: none   Sexual hx: 1 partner   STD hx: declined  Occupation: office job   Mammogram: May 2023 negative. Colonoscopy: 2017 10 yr plan   Pap smear:  2018 normal   Tdap: 2019  Diet: healthy diet seeing [facility]  Exercise: 2 times per week. Review of Systems   Constitutional: Negative for fever, chills and fatigue.  No distress. HENT: Negative for hearing loss, congestion, sore throat, neck pain   Eyes: Negative for pain and visual disturbance. Respiratory: Negative for cough, chest tightness, shortness of breath and wheezing. Cardiovascular: Negative for chest pain, palpitations and leg swelling. Gastrointestinal: Negative for nausea, vomiting, abdominal pain, diarrhea, blood in stool and abdominal distention. Genitourinary: Negative for dysuria, hematuria and difficulty urinating. Musculoskeletal: Negative for myalgias, back pain, joint swelling, arthralgias and gait problem. Skin: Negative for color change and rash. Neurological: Negative for dizziness, syncope, weakness, numbness, tingling and headaches. Hematological: Negative for adenopathy. Does not bruise/bleed easily. Psychiatric/Behavioral: The patient is not nervous/anxious. No depression.     Patient Active Problem List:     Allergic rhinitis     Ventral hernia     Diastasis recti     TMJ (temporomandibular joint syndrome)     Nasal septal deviation     Mixed hyperlipidemia     Multiple thyroid nodules     Hypothyroidism due to Hashimoto's thyroiditis     Facet arthropathy, cervical     Vitamin D insufficiency      Past Medical History:   Diagnosis Date    Acquired hypothyroidism     BPPV (benign paroxysmal positional vertigo) 09/24/2019    COVID-19 virus infection 11/20/2020    Deep vein thrombosis ([address] 1990    right leg, ankle injury & OCP    Diastasis recti 11/17/2014    Esophageal reflux 2014    Not noted by ENT in 2016    Facet arthropathy, cervical 02/03/2020    Hypothyroidism     Mixed hyperlipidemia 2017    PONV (postoperative nausea and vomiting)     Thyroid nodule     saw Dr. July Sorensen, repeat q 5 yrs, last imaged 5/19    TMJ (temporomandibular joint syndrome)     Visual impairment     glasses    Vitamin D insufficiency 2019     Past Surgical History:   Procedure Laterality Date    ANESTH,ARTHROSCOPY OF HIP  01/23/2019    torn labrum/tendon, Dr. Floyd Dukes  2003    20 weeks Pregnant    BIOPSY OF THYROID,PERCUT  09/10/2018    benign, Dr. Ade Nuñez    COLONOSCOPY  2017    repeat in 10 yrs, Dr. Memo Andres, [facility].    COLONOSCOPY  2017    CYST ASPIRATION LEFT  2004    HERNIA SURGERY      umbilical hernia stitched repair      2000&    OTHER SURGICAL HISTORY  2006    Dec 2016 - sinus surgery    REPAIR ROTATOR CUFF,ACUTE Bilateral 2006    History of Rotator Cuff repair    SINUS SURGERY        Dr. Ade Nuñez (\"roto rooted, septum deviation correction\")     Family History   Problem Relation Age of Onset    Allergies Brother     Allergies Mother     Other (Nasal Polyps) Brother     Other (Osteoporosis) Maternal Grandmother     Other (Osteoporosis) Paternal Grandmother     Hypertension Father     Alcohol and Other Disorders Associated Paternal Grandfather     Other (rheumatoid arthritis) Sister     No Known Problems Sister     Bleeding Disorders Neg     Anesthesia Problems  Neg      Social History     Socioeconomic History    Marital status:    Tobacco Use    Smoking status: Never    Smokeless tobacco: Never   Vaping Use    Vaping Use: Never used   Substance and Sexual Activity    Alcohol use:  Yes     Alcohol/week: 4.0 - 6.0 standard drinks of alcohol     Types: 4 - 6 Glasses of wine per week     Comment: Per week    Drug use: No    Sexual activity: Yes     Partners: Male     Birth control/protection: Vasectomy   Other Topics Concern    Caffeine Concern No    Stress Concern No    Weight Concern No    Special Diet Yes     Comment: Gluten free    Exercise Yes    Seat Belt Yes       Current Outpatient Medications   Medication Sig Dispense Refill    levothyroxine 112 MCG Oral Tab TAKE 1 TABLET BY MOUTH EVERY DAY ON MONDAY, WEDNESDAY, FRIDAY ONLY 38 tablet 0    levothyroxine 100 MCG Oral Tab TAKE 1 TABLET BY MOUTH BEFORE BREAKFAST ON ,TUESDAY,THURSDAY AND SATURDAY 51 tablet 0    SAXENDA 18 MG/3ML Subcutaneous Solution Pen-injector Inject 3 mg into the skin daily. Week 1- 0.6mg daily. Week 2- 1.2mg daily. Week 3- 1.8mg daily. Week 4- 2.4mg daily. Week 5- 3mg daily. 45 mL 0    Insulin Pen Needle (PEN NEEDLES) 32G X 4 MM Does not apply Misc Inject 1 each into the skin daily. 100 each 1    estradiol 0.1 MG/GM Vaginal Cream Insert 1 gram daily intravaginally for 2 weeks, then 1 gram two times per week 42.5 g 3    Mometasone Furoate (NASONEX NA) 2 sprays by Nasal route every morning. triamcinolone acetonide 0.1 % External Cream Apply topically 2 (two) times daily as needed. 45 g 1    Multiple Vitamins-Minerals (MULTI VITAMIN/MINERALS) Oral Tab Take by mouth daily. Allergies    Bactrim                 RASH  Sulfamethoxazole W/*    RASH    Health Maintenance  Immunizations:    Immunization History  Administered            Date(s) Administered    Covid-19 Vaccine Pfizer 30 mcg/0.3 ml                          04/15/2021  05/06/2021  05/19/2022      Covid-19 Vaccine Pfizer Reuben-Sucrose 30 mcg/0.3 ml                          05/19/2022      FLULAVAL 6 months & older 0.5 ml Prefilled syringe (63210)                          09/20/2017 09/21/2018 09/24/2019 09/30/2020      FLUZONE 6 months and older PFS 0.5 ml (58184)                          09/30/2020  10/25/2023      HEP A                 06/04/2019      Influenza             11/04/2016 01/01/2023      MMR                   06/03/2019      TDAP                  01/01/2009      Td, Preserv Free      09/24/2019      Zoster Vaccine Recombinant Adjuvanted (Shingrix)                          09/30/2020 12/11/2020        Physical Exam  There were no vitals taken for this visit. Constitutional: Oriented to person, place, and time. No distress. HEENT:  Normocephalic and atraumatic. Hearing and tympanic membranes normal.  Nose normal. Oropharynx is clear and moist.   Eyes: Conjunctivae and EOM are normal. PERRLA.  No scleral icterus. Neck: Normal range of motion. Neck supple. No mass and no thyromegaly present. Cardiovascular: Normal rate, regular rhythm and intact distal pulses. No murmur, rubs or gallops. Pulmonary/Chest: Effort normal and breath sounds normal. No respiratory distress. No wheezes, rhonchi or rales  Abdominal: Soft. Bowel sounds are normal. Non tender, no masses, no organomegaly or hernias. Neurological: grossly normal   Skin: Skin is warm and dry. No rash noted. No erythema. No pallor. Psychiatric: Normal mood and affect. BREASTS:  No chest deformity, asymmetry. Normal contours. No nodules, masses, tenderness, or axillary adenopathy. No nipple discharge. : normal vagina, normal cervix, no CMT, no discharge, no lesions. A/P:    Well adult exam  (primary encounter diagnosis)  Routine cervical smear  Screening for cervical cancer  Need for immunization against influenza     1. Routine cervical smear  - Hpv High Risk , Thin Prep Collect; Future    2. Screening for cervical cancer  - ThinPrep PAP Smear B; Future    3. Need for immunization against influenza  - Immunization Admin Counseling, 1st Component, 18 years and older  - Fluzone Quadrivalent 6mo and older, 0.5mL    4. Multiple thyroid nodules  US  2022 stable. Denies hoarseness, neck pain, dysphagia. 5. Hypothyroidism due to Hashimoto's thyroiditis  Pt to f/u with Dr. Risa Rubio. Due to symptoms changed levothyroxine dosing to 112mcg daily. Will recheck labs in 6 weeks then again in 3 months following that. 6. Mixed hyperlipidemia  Lipid ordered. 7. Well woman exam with routine gynecological exam  - -Discussed diet and exercise, counseled on vaccine and screening guidelines. 8. Vaginal atrophy  Increased dose to three times weekly. - estradiol 0.1 MG/GM Vaginal Cream; Place 1 g vaginally 3 (three) times a week.  Insert 1 gram daily intravaginally for 2 weeks, then 1 gram two times per week  Dispense: 42.5 g; Refill: 3 Orders Placed This Encounter      Hpv High Risk , Thin Prep Collect      Lipid Panel      Fluzone Quadrivalent 6mo and older, 0.5mL      Immunization Admin Counseling, 1st Component, 18 years and older      ThinPrep PAP Smear B      Meds & Refills for this Visit:  Requested Prescriptions      No prescriptions requested or ordered in this encounter       Imaging & Consults:  INFLUENZA VACCINE, QUAD, PRESERVATIVE FREE, 0.5 ML      No follow-ups on file. There are no Patient Instructions on file for this visit. All questions were answered and the patient understands the plan. Chacorta Trejo DO        This note was prepared using Dragon Medical voice recognition dictation software. As a result errors may occur. When identified these errors have been corrected.  While every attempt is made to correct errors during dictation discrepancies may still exist.

## 2023-10-26 ENCOUNTER — PATIENT MESSAGE (OUTPATIENT)
Dept: FAMILY MEDICINE CLINIC | Facility: CLINIC | Age: 58
End: 2023-10-26

## 2023-10-26 LAB — HPV I/H RISK 1 DNA SPEC QL NAA+PROBE: NEGATIVE

## 2023-10-30 LAB
.: NORMAL
.: NORMAL

## 2023-11-03 ENCOUNTER — TELEPHONE (OUTPATIENT)
Dept: FAMILY MEDICINE CLINIC | Facility: CLINIC | Age: 58
End: 2023-11-03

## 2023-11-03 RX ORDER — FLASH GLUCOSE SENSOR
KIT MISCELLANEOUS
Qty: 1 EACH | Refills: 10 | Status: SHIPPED | OUTPATIENT
Start: 2023-11-03

## 2023-12-08 ENCOUNTER — LAB ENCOUNTER (OUTPATIENT)
Dept: LAB | Age: 58
End: 2023-12-08
Attending: FAMILY MEDICINE
Payer: COMMERCIAL

## 2023-12-08 DIAGNOSIS — E06.3 HYPOTHYROIDISM DUE TO HASHIMOTO'S THYROIDITIS: ICD-10-CM

## 2023-12-08 DIAGNOSIS — E03.8 HYPOTHYROIDISM DUE TO HASHIMOTO'S THYROIDITIS: ICD-10-CM

## 2023-12-08 LAB
T4 FREE SERPL-MCNC: 0.9 NG/DL (ref 0.8–1.7)
TSI SER-ACNC: 5.08 MIU/ML (ref 0.36–3.74)

## 2023-12-08 PROCEDURE — 84443 ASSAY THYROID STIM HORMONE: CPT

## 2023-12-08 PROCEDURE — 84439 ASSAY OF FREE THYROXINE: CPT

## 2023-12-11 ENCOUNTER — PATIENT MESSAGE (OUTPATIENT)
Dept: FAMILY MEDICINE CLINIC | Facility: CLINIC | Age: 58
End: 2023-12-11

## 2023-12-11 NOTE — TELEPHONE ENCOUNTER
From: Pawan Weller  To: Justus Gold  Sent: 12/11/2023 3:41 PM CST  Subject: Labs 12/8     Please review these results, as they appear essentially unchanged from my last visit. I have followed the revised prescription since my physical. Symptoms are unchanged as well.  Thanks

## 2023-12-12 DIAGNOSIS — E03.8 HYPOTHYROIDISM DUE TO HASHIMOTO'S THYROIDITIS: ICD-10-CM

## 2023-12-12 DIAGNOSIS — E06.3 HYPOTHYROIDISM DUE TO HASHIMOTO'S THYROIDITIS: ICD-10-CM

## 2023-12-12 DIAGNOSIS — E03.8 HYPOTHYROIDISM DUE TO HASHIMOTO'S THYROIDITIS: Primary | ICD-10-CM

## 2023-12-12 DIAGNOSIS — E06.3 HYPOTHYROIDISM DUE TO HASHIMOTO'S THYROIDITIS: Primary | ICD-10-CM

## 2023-12-12 RX ORDER — LEVOTHYROXINE SODIUM 0.12 MG/1
125 TABLET ORAL
Qty: 60 TABLET | Refills: 0 | Status: SHIPPED | OUTPATIENT
Start: 2023-12-12 | End: 2023-12-13

## 2023-12-13 RX ORDER — LEVOTHYROXINE SODIUM 0.12 MG/1
125 TABLET ORAL
Qty: 90 TABLET | Refills: 0 | Status: SHIPPED | OUTPATIENT
Start: 2023-12-13

## 2023-12-13 NOTE — TELEPHONE ENCOUNTER
See Result Note. Patient informed via Sparktrend of results.     Chart shows pt viewed Sparktrend message:  Written by Kasia Wick DO on 12/12/2023  9:48 AM CST  Seen by patient Maged Keane on 12/12/2023  9:49 AM

## 2024-02-08 ENCOUNTER — LAB ENCOUNTER (OUTPATIENT)
Dept: LAB | Age: 59
End: 2024-02-08
Attending: FAMILY MEDICINE
Payer: COMMERCIAL

## 2024-02-08 DIAGNOSIS — E03.8 HYPOTHYROIDISM DUE TO HASHIMOTO'S THYROIDITIS: ICD-10-CM

## 2024-02-08 DIAGNOSIS — E06.3 HYPOTHYROIDISM DUE TO HASHIMOTO'S THYROIDITIS: ICD-10-CM

## 2024-02-08 LAB
T3FREE SERPL-MCNC: 2.6 PG/ML (ref 2.4–4.2)
T4 FREE SERPL-MCNC: 1.2 NG/DL (ref 0.8–1.7)
TSI SER-ACNC: 0.26 MIU/ML (ref 0.36–3.74)

## 2024-02-08 PROCEDURE — 84481 FREE ASSAY (FT-3): CPT | Performed by: FAMILY MEDICINE

## 2024-02-08 PROCEDURE — 84443 ASSAY THYROID STIM HORMONE: CPT | Performed by: FAMILY MEDICINE

## 2024-02-08 PROCEDURE — 84439 ASSAY OF FREE THYROXINE: CPT | Performed by: FAMILY MEDICINE

## 2024-02-12 ENCOUNTER — PATIENT MESSAGE (OUTPATIENT)
Dept: FAMILY MEDICINE CLINIC | Facility: CLINIC | Age: 59
End: 2024-02-12

## 2024-02-12 DIAGNOSIS — E06.3 HYPOTHYROIDISM DUE TO HASHIMOTO'S THYROIDITIS: Primary | ICD-10-CM

## 2024-02-12 DIAGNOSIS — E03.8 HYPOTHYROIDISM DUE TO HASHIMOTO'S THYROIDITIS: Primary | ICD-10-CM

## 2024-02-12 RX ORDER — LEVOTHYROXINE SODIUM 112 UG/1
112 TABLET ORAL
Qty: 90 TABLET | Refills: 0 | Status: SHIPPED | OUTPATIENT
Start: 2024-02-12 | End: 2024-05-12

## 2024-02-20 NOTE — TELEPHONE ENCOUNTER
From: Shavonne Waller  To: Tad Henaoammed  Sent: 2/12/2024 11:25 AM CST  Subject: Thyroid results    Two items, please:  1) I feel much better at this thyroid dosage. Could we at least try alternating .125 and .112 during a week? My previous instructions were take the higher dosage M-W-F. Rather than just go back to the only lower dosage?  2) I am basically out of Saxenda now. My weight went up (~7 lbs) over the holidays and is now back to essentially what it was at my October visit. Saxenda makes me pretty nauseous at more than about half the full dosage. Never found a way to make that manageable. I am open to other choices.     Thank you

## 2024-02-25 NOTE — TELEPHONE ENCOUNTER
Okay to change it to levothyroxine 125mcg MWF and 112mcg T TH Sat.     Will check labs again.     She can see Yari regarding the Saxenda, I never prescribed it.

## 2024-04-04 ENCOUNTER — HOSPITAL ENCOUNTER (OUTPATIENT)
Dept: ULTRASOUND IMAGING | Age: 59
Discharge: HOME | End: 2024-04-04
Attending: OTOLARYNGOLOGY
Payer: COMMERCIAL

## 2024-04-04 DIAGNOSIS — E04.2 MULTIPLE THYROID NODULES: ICD-10-CM

## 2024-04-04 PROCEDURE — 76536 US EXAM OF HEAD AND NECK: CPT | Performed by: OTOLARYNGOLOGY

## 2024-04-05 ENCOUNTER — LAB ENCOUNTER (OUTPATIENT)
Dept: LAB | Age: 59
End: 2024-04-05
Attending: FAMILY MEDICINE
Payer: COMMERCIAL

## 2024-04-05 DIAGNOSIS — E06.3 HYPOTHYROIDISM DUE TO HASHIMOTO'S THYROIDITIS: ICD-10-CM

## 2024-04-05 DIAGNOSIS — E03.8 HYPOTHYROIDISM DUE TO HASHIMOTO'S THYROIDITIS: ICD-10-CM

## 2024-04-05 LAB
T3FREE SERPL-MCNC: 2.66 PG/ML (ref 2.4–4.2)
T4 FREE SERPL-MCNC: 1.1 NG/DL (ref 0.8–1.7)
TSI SER-ACNC: 0.2 MIU/ML (ref 0.36–3.74)

## 2024-04-05 PROCEDURE — 84439 ASSAY OF FREE THYROXINE: CPT

## 2024-04-05 PROCEDURE — 84443 ASSAY THYROID STIM HORMONE: CPT

## 2024-04-05 PROCEDURE — 84481 FREE ASSAY (FT-3): CPT

## 2024-06-19 ENCOUNTER — ORDER TRANSCRIPTION (OUTPATIENT)
Dept: ADMINISTRATIVE | Facility: HOSPITAL | Age: 59
End: 2024-06-19

## 2024-06-19 DIAGNOSIS — Z12.31 ENCOUNTER FOR SCREENING MAMMOGRAM FOR MALIGNANT NEOPLASM OF BREAST: Primary | ICD-10-CM

## 2024-06-21 ENCOUNTER — HOSPITAL ENCOUNTER (OUTPATIENT)
Dept: MAMMOGRAPHY | Facility: HOSPITAL | Age: 59
Discharge: HOME | End: 2024-06-21
Attending: FAMILY MEDICINE

## 2024-06-21 DIAGNOSIS — Z12.31 ENCOUNTER FOR SCREENING MAMMOGRAM FOR MALIGNANT NEOPLASM OF BREAST: ICD-10-CM

## 2024-06-21 PROCEDURE — 77063 BREAST TOMOSYNTHESIS BI: CPT | Performed by: FAMILY MEDICINE

## 2024-06-21 PROCEDURE — 77067 SCR MAMMO BI INCL CAD: CPT | Performed by: FAMILY MEDICINE

## 2025-04-14 DIAGNOSIS — N95.2 VAGINAL ATROPHY: ICD-10-CM

## 2025-04-18 RX ORDER — ESTRADIOL 0.1 MG/G
CREAM VAGINAL
Qty: 42.5 G | Refills: 3 | OUTPATIENT
Start: 2025-04-18

## 2025-04-18 NOTE — TELEPHONE ENCOUNTER
Requesting Estradiol 0.01%  Last OV: 10/25/23 Physical  RTC: not noted  Last Rx'd 4/24/23 #42.5g with 3 refills    No future appointments.    Patient hasn't been seen since October 2023. Request denied, appointment needed for refills.

## (undated) NOTE — LETTER
07/12/17        [facility]      Dear Debora Vieyra,    [facility] records indicate that you have outstanding lab work and or testing that was ordered for you and has not yet been completed:          TSH and Free T4 [E]  To

## (undated) NOTE — LETTER
Patient Name: Brian Ceja  YOB: 1965          MRN number:  [id number]  Date:  10/7/2020  Referring Physician:  Tere Nuñez         SPINE EVALUATION:    Referring Physician: Dr. Donnie Apodaca  Diagnosis: Cervical Facet Arthropathy     Date of Se The patient presents with the signs and symptoms of the Rehab Diagnosis of cervical facet restriction of upper cervical spine supported by the clinical findings of decreased facet motion of the OA and AA joint and thoracic restrictions  Connor Kirby would benefit 1. Increase FOTO assessment > 11% from INE to DC. 2. Patient will be aware of postural limitations and be able to correct them independently.     3. Patient will have an increase in spinal mobility to 75% full motion in order to return to looking over her

## (undated) NOTE — LETTER
Patient Name: [facility]  YOB: 1965          MRN number:  [id number]  Date:  12/20/2017  Referring Physician:  Chantal Harry    Dear Dr. Julian Floyd,     Discharge Summary    Pt has attended 12, cancelled 2, and no shown 0 visits in Physical Th Balance: SLS: R 30 sec no pain  L 30 sec no pain    R painful* - 10 sec (most painful), L 30 sec    Sit to stand - pain free   Special tests:   Fabers: R -, L - (at eval  Was positive)  FAIDERS: r -  l - (at eval  Was positive)    Goals:   -Pt to N[facility] Hendrickson

## (undated) NOTE — Clinical Note
Dr. Cheko Oseguera,  Ms. Marley Deshawn has lost a total of #42 lbs so far!    Sincerely, ANNE MARIE Franco APRN, [facility] [address] [facility]  [facility] [address], [address], [place], [address]   [address]

## (undated) NOTE — LETTER
Patient Name: Mejia Rubio  YOB: 1965          MRN number:  [id number]  Date:  10/2/2017  Referring Physician:  Alla Bhatti      LOWER EXTREMITY EVALUATION: Referring Physician: Dr. García Luevano  Diagnosis: R Hip pain     Date of Service: 10/2/2 Distraction to right LE gives mild relief. She also presents with symptoms of piriformis irritation with pain reproduced with active contraction of piriformis, palpation to muscle belly and passive elongation.    Marisol Sheth would benefit from skilled Physical Th Gastroc-soleus: R Mild restrictions ; L mild restricitons     Strength/MMT:   Hip Knee Foot/Ankle   Flexion: R 4/5; L 5/5  Extension: R 4/5; L 4/5  Abduction: R 4-/5; L 4/5  ER: R 5/5*; L 5/5  IR: R 5/5; L 5/5 Flexion: R 5/5; L 5/5  Extension: R 5/5; L 5/5 -Pt to be independent and compliant with HEP. Frequency / Duration: Patient will be seen for 1-2 x/week or a total of 8 visits over a 90 day period. Treatment will include: Manual Therapy; Therapeutic Exercises; Neuromuscular Re-education;  Therapeutic

## (undated) NOTE — LETTER
Patient Name: Danielle Cowan  YOB: 1965          MRN number:  [id number]  Date:  3/2/2020  Referring Physician:  Edgardo Kohler         SPINE EVALUATION:    Referring Physician: Dr. Ramirez Alert  Diagnosis: Cervical Facet Impingement - Right C7T1 Observation/Posture: The patient presents with a structural assessment of an increase in thoracic kyphosis and loss of cervical lordosis. Gait: Good.     Neuro Screen: The patient was assessed for MMT, sensation, and reflexes per all myotomes and dermato capsular motion for the upper thoracic spine in order to return to sustained positioning when driving. 4. Patient will have an increase in core strength to assist with returning to exercise as previous with her .     5. Patient will demon